# Patient Record
Sex: FEMALE | Race: BLACK OR AFRICAN AMERICAN | NOT HISPANIC OR LATINO | Employment: FULL TIME | URBAN - METROPOLITAN AREA
[De-identification: names, ages, dates, MRNs, and addresses within clinical notes are randomized per-mention and may not be internally consistent; named-entity substitution may affect disease eponyms.]

---

## 2017-12-13 ENCOUNTER — OFFICE VISIT (OUTPATIENT)
Dept: OBSTETRICS AND GYNECOLOGY | Facility: CLINIC | Age: 27
End: 2017-12-13
Payer: COMMERCIAL

## 2017-12-13 ENCOUNTER — LAB VISIT (OUTPATIENT)
Dept: LAB | Facility: HOSPITAL | Age: 27
End: 2017-12-13
Attending: OBSTETRICS & GYNECOLOGY
Payer: COMMERCIAL

## 2017-12-13 VITALS — DIASTOLIC BLOOD PRESSURE: 74 MMHG | SYSTOLIC BLOOD PRESSURE: 112 MMHG | WEIGHT: 156.06 LBS

## 2017-12-13 DIAGNOSIS — Z01.419 ENCOUNTER FOR ANNUAL ROUTINE GYNECOLOGICAL EXAMINATION: Primary | ICD-10-CM

## 2017-12-13 DIAGNOSIS — Z11.3 SCREENING EXAMINATION FOR STD (SEXUALLY TRANSMITTED DISEASE): ICD-10-CM

## 2017-12-13 DIAGNOSIS — Z01.419 ENCOUNTER FOR ANNUAL ROUTINE GYNECOLOGICAL EXAMINATION: ICD-10-CM

## 2017-12-13 DIAGNOSIS — Z12.4 PAP SMEAR FOR CERVICAL CANCER SCREENING: ICD-10-CM

## 2017-12-13 PROCEDURE — 86592 SYPHILIS TEST NON-TREP QUAL: CPT

## 2017-12-13 PROCEDURE — 88175 CYTOPATH C/V AUTO FLUID REDO: CPT

## 2017-12-13 PROCEDURE — 87480 CANDIDA DNA DIR PROBE: CPT

## 2017-12-13 PROCEDURE — 86703 HIV-1/HIV-2 1 RESULT ANTBDY: CPT

## 2017-12-13 PROCEDURE — 36415 COLL VENOUS BLD VENIPUNCTURE: CPT

## 2017-12-13 PROCEDURE — 87660 TRICHOMONAS VAGIN DIR PROBE: CPT

## 2017-12-13 PROCEDURE — 99999 PR PBB SHADOW E&M-NEW PATIENT-LVL III: CPT | Mod: PBBFAC,,, | Performed by: OBSTETRICS & GYNECOLOGY

## 2017-12-13 PROCEDURE — 80074 ACUTE HEPATITIS PANEL: CPT

## 2017-12-13 PROCEDURE — 87591 N.GONORRHOEAE DNA AMP PROB: CPT

## 2017-12-13 NOTE — PROGRESS NOTES
Chief Complaint   Patient presents with    Well Woman       HISTORY OF PRESENT ILLNESS:   Jahaira Evangelista is a 27 y.o. female female patient who presents for well woman exam.  Patient's last menstrual period was 2017 (exact date)..  She has no complaints.  Cycles are regular. Desires STD testing but unsure if she wants to do blood work testing.  declines   birth control as she isn't sexually active now.      History reviewed. No pertinent past medical history.     History reviewed. No pertinent surgical history.      Social History     Social History    Marital status: Single     Spouse name: N/A    Number of children: N/A    Years of education: N/A     Occupational History    Not on file.     Social History Main Topics    Smoking status: Never Smoker    Smokeless tobacco: Never Used    Alcohol use No    Drug use: No    Sexual activity: Not Currently     Other Topics Concern    Not on file     Social History Narrative    No narrative on file       Family History   Problem Relation Age of Onset    Breast cancer Paternal Aunt          OB History    Para Term  AB Living   0 0 0 0 0 0   SAB TAB Ectopic Multiple Live Births   0 0 0 0 0               COMPREHENSIVE GYN HISTORY:  PAP History: Denies abnormal Paps  Infection History: Denies STDs. Denies PID.  Benign History:Denies uterine fibroids. Denies ovarian cysts. Denies endometriosis Denies other conditions.  Cancer History: Denies cervical cancer. Denies uterine cancer or hyperplasia. Denies ovarian cancer. Denies vulvar cancer or pre-cancer. Denies vaginal cancer or pre-cancer. Denies breast cancer. Denies colon cancer.    ROS:  GENERAL: Denies weight gain or weight loss. Feeling well overall.   SKIN: Denies rash or lesions.   HEAD: Denies headache.   NODES: Denies enlarged lymph nodes.   CHEST: Denies shortness of breath.   ABDOMEN: No abdominal pain, constipation, diarrhea, nausea, vomiting or rectal bleeding.   URINARY: No  "frequency, dysuria, hematuria, or burning on urination.  REPRODUCTIVE: See HPI.   BREASTS: The patient denies pain, lumps, or nipple discharge.   HEMATOLOGIC: No easy bruisability.   MUSCULOSKELETAL: Denies joint pain or back pain.   NEUROLOGIC: Denies weakness.   PSYCHIATRIC: Denies depression, anxiety or mood swings.    /74   Wt 70.8 kg (156 lb 1.4 oz)   LMP 11/23/2017 (Exact Date)     APPEARANCE: Well nourished, well developed, in no acute distress.  NECK: Neck symmetric without  thyromegaly.  NODES: No inguinal, cervical lymph node enlargement.  CHEST: Lungs clear to auscultation.  HEART: Regular rate and rhythm, no murmurs, rubs or gallops.  ABDOMEN: Soft. No tenderness or masses. No hernias.  BREASTS: Symmetrical, no skin changes or visible lesions. No palpable masses, nipple discharge or adenopathy bilaterally.  PELVIC:   VULVA: No lesions. Normal female genitalia.  URETHRAL MEATUS: Normal size and location, no lesions, no prolapse.  URETHRA: No masses, tenderness, prolapse or scarring.  VAGINA: Moist and well rugated, no discharge, no significant cystocele or rectocele.  CERVIX: No lesions and discharge.  UTERUS: Normal anteverted size, regular shape, mobile, non-tender, bladder base nontender.  ADNEXA: No masses or tenderness.    Data Reviewed:   Last Pap: Date: ** Result: **  Last STD: Date: ** Result:**    1. Encounter for annual routine gynecological examination    2. Pap smear for cervical cancer screening    3. Screening examination for STD (sexually transmitted disease)        Plan:  Routine gyn s/p normal breast exam. Pap without HPV cotesting ordered . STD testing: GC/CT/trich, syphilis, HBV/HCV and HIV "ordered.       F/u in 1 yr or PN      "

## 2017-12-14 LAB
CANDIDA RRNA VAG QL PROBE: NEGATIVE
G VAGINALIS RRNA GENITAL QL PROBE: NEGATIVE
HAV IGM SERPL QL IA: NEGATIVE
HBV CORE IGM SERPL QL IA: NEGATIVE
HBV SURFACE AG SERPL QL IA: NEGATIVE
HBV SURFACE AG SERPL QL IA: NEGATIVE
HCV AB SERPL QL IA: NEGATIVE
HIV 1+2 AB+HIV1 P24 AG SERPL QL IA: NEGATIVE
RPR SER QL: NORMAL
T VAGINALIS RRNA GENITAL QL PROBE: NEGATIVE

## 2017-12-15 ENCOUNTER — TELEPHONE (OUTPATIENT)
Dept: OBSTETRICS AND GYNECOLOGY | Facility: CLINIC | Age: 27
End: 2017-12-15

## 2017-12-15 LAB
C TRACH DNA SPEC QL NAA+PROBE: NOT DETECTED
N GONORRHOEA DNA SPEC QL NAA+PROBE: NOT DETECTED

## 2017-12-15 NOTE — TELEPHONE ENCOUNTER
Please call patient and let her know that her STD testing was negative. We are still waiting on the results of her pap smear.

## 2017-12-15 NOTE — TELEPHONE ENCOUNTER
Spoke with pt and informed her of negative STD screening results. Informed pt we are still awaiting her pap results and will call her once they are in. Patient verbalized understanding.

## 2017-12-27 ENCOUNTER — LAB VISIT (OUTPATIENT)
Dept: LAB | Facility: HOSPITAL | Age: 27
End: 2017-12-27
Attending: FAMILY MEDICINE
Payer: COMMERCIAL

## 2017-12-27 ENCOUNTER — OFFICE VISIT (OUTPATIENT)
Dept: INTERNAL MEDICINE | Facility: CLINIC | Age: 27
End: 2017-12-27
Payer: COMMERCIAL

## 2017-12-27 VITALS
TEMPERATURE: 99 F | DIASTOLIC BLOOD PRESSURE: 63 MMHG | WEIGHT: 160.5 LBS | HEART RATE: 79 BPM | SYSTOLIC BLOOD PRESSURE: 104 MMHG | HEIGHT: 63 IN | BODY MASS INDEX: 28.44 KG/M2

## 2017-12-27 DIAGNOSIS — K64.9 HEMORRHOIDS, UNSPECIFIED HEMORRHOID TYPE: ICD-10-CM

## 2017-12-27 DIAGNOSIS — Z00.00 ROUTINE MEDICAL EXAM: ICD-10-CM

## 2017-12-27 DIAGNOSIS — Z00.00 ROUTINE MEDICAL EXAM: Primary | ICD-10-CM

## 2017-12-27 LAB
ALBUMIN SERPL BCP-MCNC: 3.4 G/DL
ALP SERPL-CCNC: 39 U/L
ALT SERPL W/O P-5'-P-CCNC: 22 U/L
ANION GAP SERPL CALC-SCNC: 4 MMOL/L
AST SERPL-CCNC: 27 U/L
BASOPHILS # BLD AUTO: 0.06 K/UL
BASOPHILS NFR BLD: 1.5 %
BILIRUB SERPL-MCNC: 0.3 MG/DL
BUN SERPL-MCNC: 9 MG/DL
CALCIUM SERPL-MCNC: 9.8 MG/DL
CHLORIDE SERPL-SCNC: 107 MMOL/L
CHOLEST SERPL-MCNC: 174 MG/DL
CHOLEST/HDLC SERPL: 2.6 {RATIO}
CO2 SERPL-SCNC: 27 MMOL/L
CREAT SERPL-MCNC: 0.7 MG/DL
DIFFERENTIAL METHOD: ABNORMAL
EOSINOPHIL # BLD AUTO: 0.4 K/UL
EOSINOPHIL NFR BLD: 9.2 %
ERYTHROCYTE [DISTWIDTH] IN BLOOD BY AUTOMATED COUNT: 14.6 %
EST. GFR  (AFRICAN AMERICAN): >60 ML/MIN/1.73 M^2
EST. GFR  (NON AFRICAN AMERICAN): >60 ML/MIN/1.73 M^2
GLUCOSE SERPL-MCNC: 90 MG/DL
HCT VFR BLD AUTO: 32 %
HDLC SERPL-MCNC: 67 MG/DL
HDLC SERPL: 38.5 %
HGB BLD-MCNC: 10.2 G/DL
IMM GRANULOCYTES # BLD AUTO: 0 K/UL
IMM GRANULOCYTES NFR BLD AUTO: 0 %
LDLC SERPL CALC-MCNC: 96.6 MG/DL
LYMPHOCYTES # BLD AUTO: 1.7 K/UL
LYMPHOCYTES NFR BLD: 41.6 %
MCH RBC QN AUTO: 27.8 PG
MCHC RBC AUTO-ENTMCNC: 31.9 G/DL
MCV RBC AUTO: 87 FL
MONOCYTES # BLD AUTO: 0.4 K/UL
MONOCYTES NFR BLD: 8.5 %
NEUTROPHILS # BLD AUTO: 1.6 K/UL
NEUTROPHILS NFR BLD: 39.2 %
NONHDLC SERPL-MCNC: 107 MG/DL
NRBC BLD-RTO: 0 /100 WBC
PLATELET # BLD AUTO: 258 K/UL
PMV BLD AUTO: 10.9 FL
POTASSIUM SERPL-SCNC: 4.7 MMOL/L
PROT SERPL-MCNC: 6.8 G/DL
RBC # BLD AUTO: 3.67 M/UL
SODIUM SERPL-SCNC: 138 MMOL/L
T4 FREE SERPL-MCNC: 0.84 NG/DL
TRIGL SERPL-MCNC: 52 MG/DL
TSH SERPL DL<=0.005 MIU/L-ACNC: 1 UIU/ML
WBC # BLD AUTO: 4.13 K/UL

## 2017-12-27 PROCEDURE — 84439 ASSAY OF FREE THYROXINE: CPT

## 2017-12-27 PROCEDURE — 84443 ASSAY THYROID STIM HORMONE: CPT

## 2017-12-27 PROCEDURE — 99395 PREV VISIT EST AGE 18-39: CPT | Mod: S$GLB,,, | Performed by: FAMILY MEDICINE

## 2017-12-27 PROCEDURE — 36415 COLL VENOUS BLD VENIPUNCTURE: CPT | Mod: PO

## 2017-12-27 PROCEDURE — 80053 COMPREHEN METABOLIC PANEL: CPT

## 2017-12-27 PROCEDURE — 85025 COMPLETE CBC W/AUTO DIFF WBC: CPT

## 2017-12-27 PROCEDURE — 99999 PR PBB SHADOW E&M-EST. PATIENT-LVL III: CPT | Mod: PBBFAC,,, | Performed by: FAMILY MEDICINE

## 2017-12-27 PROCEDURE — 80061 LIPID PANEL: CPT

## 2017-12-28 NOTE — PROGRESS NOTES
Subjective:   Patient ID: Jahaira Evangelista is a 27 y.o. female.    Chief Complaint: Annual Exam and Establish Care      HPI  28 yo female here for annual exam and to est with pcp. Asymptomatic. New to area. Works as [employer] .    She occ has a moderately painful hemorrhoid without bleeding.   Patient queried and denies any further complaints    PREVENTIVE MED  Diet  Exercise  Alcohol use  Tobacco  BP  Depression  Type 2 DM--will screen  Vision  ALL REVIEWED        PAST MEDICAL HISTORY:  History reviewed. No pertinent past medical history.    PAST SURGICAL HISTORY:  History reviewed. No pertinent surgical history.    SOCIAL HISTORY:  Social History     Social History    Marital status: Single     Spouse name: N/A    Number of children: N/A    Years of education: N/A     Occupational History    Not on file.     Social History Main Topics    Smoking status: Never Smoker    Smokeless tobacco: Never Used    Alcohol use No    Drug use: No    Sexual activity: Not Currently     Other Topics Concern    Not on file     Social History Narrative    No narrative on file       FAMILY HISTORY:  Family History   Problem Relation Age of Onset    Breast cancer Paternal Aunt        ALLERGIES AND MEDICATIONS: updated and reviewed.  Review of patient's allergies indicates:  No Known Allergies    Current Outpatient Prescriptions:     hydrocortisone-pramoxine (PROCTOFOAM-HS) rectal foam, Place 1 applicator rectally 2 (two) times daily., Disp: 10 g, Rfl: 5    Review of Systems   Constitutional: Negative for activity change, appetite change, chills, diaphoresis, fatigue, fever and unexpected weight change.   HENT: Negative for congestion, ear discharge, ear pain, facial swelling, hearing loss, nosebleeds, postnasal drip, rhinorrhea, sinus pressure, sneezing, sore throat, tinnitus, trouble swallowing and voice change.    Eyes: Negative for photophobia, pain, discharge, redness, itching and visual disturbance.   Respiratory:  "Negative for cough, chest tightness, shortness of breath and wheezing.    Cardiovascular: Negative for chest pain, palpitations and leg swelling.   Gastrointestinal: Negative for abdominal distention, abdominal pain, anal bleeding, blood in stool, constipation, diarrhea, nausea, rectal pain and vomiting.   Endocrine: Negative for cold intolerance, heat intolerance, polydipsia, polyphagia and polyuria.   Genitourinary: Negative for difficulty urinating, dysuria and flank pain.   Musculoskeletal: Negative for arthralgias, back pain, joint swelling, myalgias and neck pain.   Skin: Negative for rash.   Neurological: Negative for dizziness, tremors, seizures, syncope, speech difficulty, weakness, light-headedness, numbness and headaches.   Psychiatric/Behavioral: Negative for behavioral problems, confusion, decreased concentration, dysphoric mood, sleep disturbance and suicidal ideas. The patient is not nervous/anxious and is not hyperactive.        Objective:     Vitals:    12/27/17 0812   BP: 104/63   Pulse: 79   Temp: 99.2 °F (37.3 °C)   TempSrc: Oral   Weight: 72.8 kg (160 lb 7.9 oz)   Height: 5' 3" (1.6 m)   PainSc: 0-No pain     Body mass index is 28.43 kg/m².    Physical Exam   Constitutional: She is oriented to person, place, and time. She appears well-developed and well-nourished. She is cooperative. She does not have a sickly appearance. No distress.   HENT:   Head: Normocephalic and atraumatic.   Right Ear: Hearing, tympanic membrane, external ear and ear canal normal. No tenderness.   Left Ear: Hearing, tympanic membrane, external ear and ear canal normal. No tenderness.   Nose: Nose normal.   Mouth/Throat: Oropharynx is clear and moist. Normal dentition. No oropharyngeal exudate, posterior oropharyngeal edema or posterior oropharyngeal erythema.   Eyes: Conjunctivae and lids are normal. Right eye exhibits no discharge. Left eye exhibits no discharge. Right conjunctiva is not injected. Left conjunctiva is not " injected. No scleral icterus. Right eye exhibits normal extraocular motion. Left eye exhibits normal extraocular motion.   Neck: Normal range of motion. Neck supple. No JVD present. Carotid bruit is not present. No tracheal deviation and no edema present. No thyromegaly present.   Cardiovascular: Normal rate, regular rhythm, normal heart sounds and normal pulses.  Exam reveals no friction rub.    No murmur heard.  Pulmonary/Chest: Effort normal and breath sounds normal. No accessory muscle usage. No respiratory distress. She has no wheezes. She has no rhonchi. She has no rales.   Musculoskeletal: She exhibits no edema.   Lymphadenopathy:        Head (right side): No submandibular adenopathy present.        Head (left side): No submandibular adenopathy present.     She has no cervical adenopathy.   Neurological: She is alert and oriented to person, place, and time.   Skin: Skin is warm and dry. She is not diaphoretic.   Psychiatric: Her speech is normal and behavior is normal. Thought content normal. Her mood appears not anxious. Her affect is not angry, not labile and not inappropriate. She does not exhibit a depressed mood.       Assessment and Plan:   Jahaira was seen today for annual exam and establish care.    Diagnoses and all orders for this visit:    Routine medical exam  -     CBC auto differential; Future  -     Comprehensive metabolic panel; Future  -     Lipid panel; Future  -     TSH; Future  -     T4, free; Future  -     Urinalysis; Future    Hemorrhoids, unspecified hemorrhoid type    -     hydrocortisone-pramoxine (PROCTOFOAM-HS) rectal foam; Place 1 applicator rectally 2 (two) times daily.  Discussed staying reg with water, fiber. Declines referral.      Return in about 1 year (around 12/27/2018).        THIS NOTE WILL BE SHARED WITH THE PATIENT.

## 2018-04-12 ENCOUNTER — OFFICE VISIT (OUTPATIENT)
Dept: ORTHOPEDICS | Facility: CLINIC | Age: 28
End: 2018-04-12
Payer: COMMERCIAL

## 2018-04-12 VITALS
HEART RATE: 72 BPM | SYSTOLIC BLOOD PRESSURE: 96 MMHG | BODY MASS INDEX: 29.5 KG/M2 | WEIGHT: 166.5 LBS | DIASTOLIC BLOOD PRESSURE: 54 MMHG | HEIGHT: 63 IN

## 2018-04-12 DIAGNOSIS — S63.92XA HAND SPRAIN, LEFT, INITIAL ENCOUNTER: Primary | ICD-10-CM

## 2018-04-12 PROCEDURE — 99204 OFFICE O/P NEW MOD 45 MIN: CPT | Mod: S$GLB,,, | Performed by: ORTHOPAEDIC SURGERY

## 2018-04-12 PROCEDURE — 99999 PR PBB SHADOW E&M-EST. PATIENT-LVL III: CPT | Mod: PBBFAC,,, | Performed by: ORTHOPAEDIC SURGERY

## 2018-04-12 NOTE — PROGRESS NOTES
Subjective:      Patient ID: Jahaira Evangelista is a 27 y.o. female.    Chief Complaint: Pain and Injury of the Left Wrist    Patient presents with left wrist s/p fall.  Had immediate disability of function after the injury.  Presented to outside facility where the patient was given ortho follow-up.  This is a closed injury.  No paresthesias/numbness.            Review of Systems   Constitution: Negative for chills and fever.   Cardiovascular: Negative for chest pain and syncope.   Respiratory: Negative for cough and shortness of breath.    Gastrointestinal: Negative for nausea and vomiting.   Neurological: Negative for brief paralysis and seizures.   Psychiatric/Behavioral: Negative for altered mental status and hallucinations.         Objective:            General    Constitutional: She is oriented to person, place, and time. She appears well-developed and well-nourished.   HENT:   Head: Normocephalic and atraumatic.   Eyes: Conjunctivae are normal.   Neck: Normal range of motion.   Cardiovascular: Intact distal pulses.    Pulmonary/Chest: Effort normal.   Neurological: She is alert and oriented to person, place, and time.   Psychiatric: She has a normal mood and affect. Her behavior is normal. Judgment and thought content normal.         Left Hand/Wrist Exam     Inspection   Scars: Hand -  absent  Effusion: Hand -  absent  Bruising: Hand -  absent  Deformity: Hand -  absent    Range of Motion     Wrist   Extension: 50   Flexion: 90     Tests   Flexor Digitorum Superficialis Test: normal  Flexor Digitorum Profundus Test: normal      Other     Sensory Exam  Median Distribution: normal  Ulnar Distribution: normal  Radial Distribution: normal          Muscle Strength   Left Upper Extremity  Wrist Extension: 5/5/5   Wrist Flexion: 5/5/5   :  5/5/5   Index Finger: 5/5  Middle Finger: 5/5  Ring Finger: 5/5  Little Finger: 5/5  Thumb - APB: 5/5  Thumb - FPL: 5/5    Vascular Exam       Capillary Refill  Left Hand: normal  capillary refill    Radiographs of the LEFT wrist/hand demonstrate no fracture/dislocation          Assessment:       Encounter Diagnosis   Name Primary?    Hand sprain, left, initial encounter Yes          Plan:       Jahaira was seen today for pain and injury.    Diagnoses and all orders for this visit:    Hand sprain, left, initial encounter      26yo F with LEFT hand sprain    Activity as tolerated  RTC 1 month

## 2018-05-31 ENCOUNTER — TELEPHONE (OUTPATIENT)
Dept: ORTHOPEDICS | Facility: CLINIC | Age: 28
End: 2018-05-31

## 2018-07-19 ENCOUNTER — OFFICE VISIT (OUTPATIENT)
Dept: ORTHOPEDICS | Facility: CLINIC | Age: 28
End: 2018-07-19
Payer: COMMERCIAL

## 2018-07-19 VITALS
SYSTOLIC BLOOD PRESSURE: 104 MMHG | WEIGHT: 167 LBS | BODY MASS INDEX: 29.59 KG/M2 | HEART RATE: 66 BPM | DIASTOLIC BLOOD PRESSURE: 68 MMHG | HEIGHT: 63 IN

## 2018-07-19 DIAGNOSIS — S63.502D SPRAIN OF LEFT WRIST, SUBSEQUENT ENCOUNTER: Primary | ICD-10-CM

## 2018-07-19 PROCEDURE — 3008F BODY MASS INDEX DOCD: CPT | Mod: CPTII,S$GLB,, | Performed by: ORTHOPAEDIC SURGERY

## 2018-07-19 PROCEDURE — 99214 OFFICE O/P EST MOD 30 MIN: CPT | Mod: S$GLB,,, | Performed by: ORTHOPAEDIC SURGERY

## 2018-07-19 PROCEDURE — 99999 PR PBB SHADOW E&M-EST. PATIENT-LVL III: CPT | Mod: PBBFAC,,, | Performed by: ORTHOPAEDIC SURGERY

## 2018-07-19 NOTE — PROGRESS NOTES
Subjective:      Patient ID: Jahaira Evangelista is a 27 y.o. female.    Chief Complaint: Pain of the Left Wrist    Patient presents with left wrist s/p fall.  Had immediate disability of function after the injury.  Presented to outside facility where the patient was given ortho follow-up.  This is a closed injury.  No paresthesias/numbness.      Returns today residual complaints of pain with certain activities at crossfit      Pain   Pertinent negatives include no chest pain, chills, coughing, fever, nausea or vomiting.       Review of Systems   Constitution: Negative for chills and fever.   Cardiovascular: Negative for chest pain and syncope.   Respiratory: Negative for cough and shortness of breath.    Gastrointestinal: Negative for nausea and vomiting.   Neurological: Negative for brief paralysis and seizures.   Psychiatric/Behavioral: Negative for altered mental status and hallucinations.         Objective:            General    Constitutional: She is oriented to person, place, and time. She appears well-developed and well-nourished.   HENT:   Head: Normocephalic and atraumatic.   Eyes: Conjunctivae are normal.   Neck: Normal range of motion.   Cardiovascular: Intact distal pulses.    Pulmonary/Chest: Effort normal.   Neurological: She is alert and oriented to person, place, and time.   Psychiatric: She has a normal mood and affect. Her behavior is normal. Judgment and thought content normal.         Left Hand/Wrist Exam     Inspection   Scars: Hand -  absent  Effusion: Hand -  absent  Bruising: Hand -  absent  Deformity: Hand -  absent    Range of Motion     Wrist   Extension: 50   Flexion: 90     Tests   Flexor Digitorum Superficialis Test: normal  Flexor Digitorum Profundus Test: normal      Other     Sensory Exam  Median Distribution: normal  Ulnar Distribution: normal  Radial Distribution: normal          Muscle Strength   Left Upper Extremity  Wrist Extension: 5/5/5   Wrist Flexion: 5/5/5   :  5/5/5    Index Finger: 5/5  Middle Finger: 5/5  Ring Finger: 5/5  Little Finger: 5/5  Thumb - APB: 5/5  Thumb - FPL: 5/5    Vascular Exam       Capillary Refill  Left Hand: normal capillary refill    Radiographs of the LEFT wrist/hand demonstrate no fracture/dislocation        Assessment:       Encounter Diagnosis   Name Primary?    Sprain of left wrist, subsequent encounter Yes          Plan:       Jahaira was seen today for pain.    Diagnoses and all orders for this visit:    Sprain of left wrist, subsequent encounter  -     MRI Wrist Joint Without Contrast Left; Future      26yo F with LEFT hand sprain    Activity as tolerated  MRI of LEFT wrist  RTC after imaging

## 2018-07-26 ENCOUNTER — TELEPHONE (OUTPATIENT)
Dept: ORTHOPEDICS | Facility: CLINIC | Age: 28
End: 2018-07-26

## 2018-07-26 NOTE — TELEPHONE ENCOUNTER
Spoke with patient. Stated that she would like to have her MRI done at [facility] for insurance reasons. MRI order faxed to the number provided. Patient is aware. No further issues discussed.

## 2018-07-26 NOTE — TELEPHONE ENCOUNTER
----- Message from Tata Cancino sent at 7/26/2018 10:17 AM CDT -----  Type: Needs Medical Advice    Who Called:  Patient  Best Call Back Number: [phone]  Additional Information: Patient requesting order for MRI (wrist)be sent to [facility] at fax number [phone] or if any questions call back.

## 2018-08-16 ENCOUNTER — OFFICE VISIT (OUTPATIENT)
Dept: ORTHOPEDICS | Facility: CLINIC | Age: 28
End: 2018-08-16
Payer: COMMERCIAL

## 2018-08-16 VITALS
BODY MASS INDEX: 30.72 KG/M2 | HEART RATE: 75 BPM | SYSTOLIC BLOOD PRESSURE: 110 MMHG | HEIGHT: 63 IN | WEIGHT: 173.38 LBS | TEMPERATURE: 98 F | DIASTOLIC BLOOD PRESSURE: 66 MMHG

## 2018-08-16 DIAGNOSIS — S63.502D SPRAIN OF LEFT WRIST, SUBSEQUENT ENCOUNTER: Primary | ICD-10-CM

## 2018-08-16 PROCEDURE — 99214 OFFICE O/P EST MOD 30 MIN: CPT | Mod: S$GLB,,, | Performed by: ORTHOPAEDIC SURGERY

## 2018-08-16 PROCEDURE — 99999 PR PBB SHADOW E&M-EST. PATIENT-LVL III: CPT | Mod: PBBFAC,,, | Performed by: ORTHOPAEDIC SURGERY

## 2018-08-16 PROCEDURE — 3008F BODY MASS INDEX DOCD: CPT | Mod: CPTII,S$GLB,, | Performed by: ORTHOPAEDIC SURGERY

## 2018-09-13 NOTE — PROGRESS NOTES
Subjective:      Patient ID: Jahaira Evangelista is a 28 y.o. female.    Chief Complaint: Pain of the Left Wrist    Patient presents with left wrist s/p fall.  Had immediate disability of function after the injury.  Presented to outside facility where the patient was given ortho follow-up.  This is a closed injury.  No paresthesias/numbness.      Returns today for review of outside MRI      Pain   Pertinent negatives include no chest pain, chills, coughing, fever, nausea or vomiting.       Review of Systems   Constitution: Negative for chills and fever.   Cardiovascular: Negative for chest pain and syncope.   Respiratory: Negative for cough and shortness of breath.    Gastrointestinal: Negative for nausea and vomiting.   Neurological: Negative for brief paralysis and seizures.   Psychiatric/Behavioral: Negative for altered mental status and hallucinations.         Objective:            General    Constitutional: She is oriented to person, place, and time. She appears well-developed and well-nourished.   HENT:   Head: Normocephalic and atraumatic.   Eyes: Conjunctivae are normal.   Neck: Normal range of motion.   Cardiovascular: Intact distal pulses.    Pulmonary/Chest: Effort normal.   Neurological: She is alert and oriented to person, place, and time.   Psychiatric: She has a normal mood and affect. Her behavior is normal. Judgment and thought content normal.         Left Hand/Wrist Exam     Inspection   Scars: Hand -  absent  Effusion: Hand -  absent  Bruising: Hand -  absent  Deformity: Hand -  absent    Range of Motion     Wrist   Extension: 50   Flexion: 90     Tests   Flexor Digitorum Superficialis Test: normal  Flexor Digitorum Profundus Test: normal      Other     Sensory Exam  Median Distribution: normal  Ulnar Distribution: normal  Radial Distribution: normal          Muscle Strength   Left Upper Extremity  Wrist extension: 5/5/5   Wrist flexion: 5/5/5   :  5/5/5   Index Finger: 5/5  Middle Finger:  5/5  Ring Finger: 5/5  Little Finger: 5/5  Thumb - APB: 5/5  Thumb - FPL: 5/5    Vascular Exam       Capillary Refill  Left Hand: normal capillary refill    Radiographs of the LEFT wrist/hand demonstrate no fracture/dislocation    Outside MRI normal        Assessment:       No diagnosis found.       Plan:       There are no diagnoses linked to this encounter.  28yo F with LEFT hand sprain    Activity as tolerated  MRI of LEFT wrist  RTC after imaging

## 2018-12-06 NOTE — TELEPHONE ENCOUNTER
Left call back message. Calling patient to help establish new f/u appointment with Dr Claire.  
Spoke with patient, appointment established. Patient indicated understanding. No further issues discussed.  
Wear support bra and abdominal compression

## 2018-12-14 ENCOUNTER — TELEPHONE (OUTPATIENT)
Dept: OBSTETRICS AND GYNECOLOGY | Facility: CLINIC | Age: 28
End: 2018-12-14

## 2018-12-14 ENCOUNTER — OFFICE VISIT (OUTPATIENT)
Dept: OBSTETRICS AND GYNECOLOGY | Facility: CLINIC | Age: 28
End: 2018-12-14
Payer: COMMERCIAL

## 2018-12-14 VITALS
SYSTOLIC BLOOD PRESSURE: 106 MMHG | BODY MASS INDEX: 29.25 KG/M2 | DIASTOLIC BLOOD PRESSURE: 72 MMHG | WEIGHT: 165.13 LBS

## 2018-12-14 DIAGNOSIS — N85.2 ENLARGED UTERUS: ICD-10-CM

## 2018-12-14 DIAGNOSIS — D21.9 FIBROIDS: ICD-10-CM

## 2018-12-14 DIAGNOSIS — Z01.419 ENCOUNTER FOR ANNUAL ROUTINE GYNECOLOGICAL EXAMINATION: Primary | ICD-10-CM

## 2018-12-14 PROCEDURE — 99395 PREV VISIT EST AGE 18-39: CPT | Mod: S$GLB,,, | Performed by: OBSTETRICS & GYNECOLOGY

## 2018-12-14 PROCEDURE — 99999 PR PBB SHADOW E&M-EST. PATIENT-LVL II: CPT | Mod: PBBFAC,,, | Performed by: OBSTETRICS & GYNECOLOGY

## 2018-12-14 NOTE — PROGRESS NOTES
Chief Complaint   Patient presents with    Well Woman       HISTORY OF PRESENT ILLNESS:   Jahaira Evangelista is a 28 y.o. female female patient who presents for well woman exam.  Patient's last menstrual period was 2018 (exact date)..  She reports that in Feb she had a time where she couldn't use the restroom for a day then resolved. 2 weeks ago had the same issue and started having severe pain so had to go to ER and have 900cc drained. Had normal cath UA and negative UPT. Is not sexually active.  Cycles are regular. Declines STD.  declines   birth control as she isn't sexually active now. Denies vision or neurologic issues.      History reviewed. No pertinent past medical history.       Past Surgical History:   Procedure Laterality Date    KNEE ARTHROSCOPY           Social History     Socioeconomic History    Marital status: Single     Spouse name: Not on file    Number of children: Not on file    Years of education: Not on file    Highest education level: Not on file   Social Needs    Financial resource strain: Not on file    Food insecurity - worry: Not on file    Food insecurity - inability: Not on file    Transportation needs - medical: Not on file    Transportation needs - non-medical: Not on file   Occupational History    Not on file   Tobacco Use    Smoking status: Never Smoker    Smokeless tobacco: Never Used   Substance and Sexual Activity    Alcohol use: No    Drug use: No    Sexual activity: Not Currently     Partners: Male   Other Topics Concern    Not on file   Social History Narrative    Not on file       Family History   Problem Relation Age of Onset    Breast cancer Paternal Aunt     Miscarriages / Stillbirths Mother     Hypertension Father     Miscarriages / Stillbirths Sister      labor Sister     Diabetes Maternal Grandfather     Colon cancer Neg Hx     Eclampsia Neg Hx     Ovarian cancer Neg Hx     Stroke Neg Hx          OB History    Para Term   AB Living   0 0 0 0 0 0   SAB TAB Ectopic Multiple Live Births   0 0 0 0 0               COMPREHENSIVE GYN HISTORY:  PAP History: Denies abnormal Paps, 2017 NILM   Infection History: Denies STDs. Denies PID.  Benign History: has uterine fibroids. Denies ovarian cysts. Denies endometriosis Denies other conditions.  Cancer History: Denies cervical cancer. Denies uterine cancer or hyperplasia. Denies ovarian cancer. Denies vulvar cancer or pre-cancer. Denies vaginal cancer or pre-cancer. Denies breast cancer. Denies colon cancer.    ROS:  GENERAL: Denies weight gain or weight loss. Feeling well overall.   SKIN: Denies rash or lesions.   HEAD: Denies headache.   NODES: Denies enlarged lymph nodes.   CHEST: Denies shortness of breath.   ABDOMEN: No abdominal pain, constipation, diarrhea, nausea, vomiting or rectal bleeding.   URINARY: No frequency, dysuria, hematuria, or burning on urination.  REPRODUCTIVE: See HPI.   BREASTS: The patient denies pain, lumps, or nipple discharge.   HEMATOLOGIC: No easy bruisability.   MUSCULOSKELETAL: Denies joint pain or back pain.   NEUROLOGIC: Denies weakness.   PSYCHIATRIC: Denies depression, anxiety or mood swings.    /72   Wt 74.9 kg (165 lb 2 oz)   LMP 2018 (Exact Date)   BMI 29.25 kg/m²     APPEARANCE: Well nourished, well developed, in no acute distress.  NECK: Neck symmetric without  thyromegaly.  NODES: No inguinal, cervical lymph node enlargement.  CHEST: Lungs clear to auscultation.  HEART: Regular rate and rhythm, no murmurs, rubs or gallops.  ABDOMEN: Soft. No tenderness or masses. No hernias.  BREASTS: Symmetrical, no skin changes or visible lesions. No palpable masses, nipple discharge or adenopathy bilaterally.  PELVIC:   VULVA: No lesions. Normal female genitalia.  URETHRAL MEATUS: Normal size and location, no lesions, no prolapse.  URETHRA: No masses, tenderness, prolapse or scarring.  VAGINA: Moist and well rugated, no discharge, no  significant cystocele or rectocele.  CERVIX: No lesions and discharge.  UTERUS: enlarged about 12-14 size, more globular on right side, mobile, non-tender, bladder base nontender.  ADNEXA: No masses or tenderness.        1. Encounter for annual routine gynecological examination        Plan:  Routine gyn s/p normal breast exam. Pap up to date with repeat needed in 3 years. STD testing: GC/CT/trich, syphilis, HBV/HCV and HIV declined. Will get US to evaluate uterus as the enlarged uterus could be causing obstruction but must also think about other causes like urethral obstruction or neurologic component. Will f/u after US.

## 2018-12-14 NOTE — TELEPHONE ENCOUNTER
----- Message from Elmira Fern sent at 12/14/2018 12:49 PM CST -----  Contact: self, [phone] (M)  Patient states she is on her way to her 1 pm appointment today. States she will be 15 minutes late. Please advise.

## 2018-12-24 ENCOUNTER — OFFICE VISIT (OUTPATIENT)
Dept: OBSTETRICS AND GYNECOLOGY | Facility: CLINIC | Age: 28
End: 2018-12-24
Payer: COMMERCIAL

## 2018-12-24 ENCOUNTER — PROCEDURE VISIT (OUTPATIENT)
Dept: OBSTETRICS AND GYNECOLOGY | Facility: CLINIC | Age: 28
End: 2018-12-24
Payer: COMMERCIAL

## 2018-12-24 VITALS
SYSTOLIC BLOOD PRESSURE: 102 MMHG | BODY MASS INDEX: 29.02 KG/M2 | DIASTOLIC BLOOD PRESSURE: 60 MMHG | WEIGHT: 163.81 LBS

## 2018-12-24 DIAGNOSIS — N85.2 ENLARGED UTERUS: ICD-10-CM

## 2018-12-24 DIAGNOSIS — D25.9 UTERINE LEIOMYOMA, UNSPECIFIED LOCATION: Primary | ICD-10-CM

## 2018-12-24 DIAGNOSIS — D21.9 FIBROIDS: ICD-10-CM

## 2018-12-24 PROCEDURE — 99999 PR PBB SHADOW E&M-EST. PATIENT-LVL II: CPT | Mod: PBBFAC,,, | Performed by: OBSTETRICS & GYNECOLOGY

## 2018-12-24 PROCEDURE — 99213 OFFICE O/P EST LOW 20 MIN: CPT | Mod: 25,S$GLB,, | Performed by: OBSTETRICS & GYNECOLOGY

## 2018-12-24 PROCEDURE — 76856 US EXAM PELVIC COMPLETE: CPT | Mod: S$GLB,,, | Performed by: OBSTETRICS & GYNECOLOGY

## 2018-12-24 PROCEDURE — 3008F BODY MASS INDEX DOCD: CPT | Mod: CPTII,S$GLB,, | Performed by: OBSTETRICS & GYNECOLOGY

## 2018-12-24 NOTE — PROGRESS NOTES
Chief Complaint   Patient presents with    Pelvic Discomfort       HISTORY OF PRESENT ILLNESS:   Jahaira Evangelista is a 28 y.o. female female patient who presents for f/u US.  Patient's last menstrual period was 12/15/2018 (approximate)..  She reports that in Feb she had a time where she couldn't use the restroom for a day then resolved. 2 weeks ago had the same issue and started having severe pain so had to go to ER and have 900cc drained. Had normal cath UA and negative UPT. Is not sexually active.  Cycles are regular. Declines STD.  declines   birth control as she isn't sexually active now. Denies vision or neurologic issues.      History reviewed. No pertinent past medical history.       Past Surgical History:   Procedure Laterality Date    KNEE ARTHROSCOPY           Social History     Socioeconomic History    Marital status: Single     Spouse name: Not on file    Number of children: Not on file    Years of education: Not on file    Highest education level: Not on file   Social Needs    Financial resource strain: Not on file    Food insecurity - worry: Not on file    Food insecurity - inability: Not on file    Transportation needs - medical: Not on file    Transportation needs - non-medical: Not on file   Occupational History    Not on file   Tobacco Use    Smoking status: Never Smoker    Smokeless tobacco: Never Used   Substance and Sexual Activity    Alcohol use: No    Drug use: No    Sexual activity: Not Currently     Partners: Male   Other Topics Concern    Not on file   Social History Narrative    Not on file       Family History   Problem Relation Age of Onset    Breast cancer Paternal Aunt     Miscarriages / Stillbirths Mother     Hypertension Father     Miscarriages / Stillbirths Sister      labor Sister     Diabetes Maternal Grandfather     Colon cancer Neg Hx     Eclampsia Neg Hx     Ovarian cancer Neg Hx     Stroke Neg Hx          OB History    Para Term   AB Living   0 0 0 0 0 0   SAB TAB Ectopic Multiple Live Births   0 0 0 0 0               COMPREHENSIVE GYN HISTORY:  PAP History: Denies abnormal Paps, 2017 NILM   Infection History: Denies STDs. Denies PID.  Benign History: has uterine fibroids. Denies ovarian cysts. Denies endometriosis Denies other conditions.  Cancer History: Denies cervical cancer. Denies uterine cancer or hyperplasia. Denies ovarian cancer. Denies vulvar cancer or pre-cancer. Denies vaginal cancer or pre-cancer. Denies breast cancer. Denies colon cancer.    ROS:  GENERAL: Denies weight gain or weight loss. Feeling well overall.   SKIN: Denies rash or lesions.   HEAD: Denies headache.   NODES: Denies enlarged lymph nodes.   CHEST: Denies shortness of breath.   ABDOMEN: No abdominal pain, constipation, diarrhea, nausea, vomiting or rectal bleeding.   URINARY: No frequency, dysuria, hematuria, or burning on urination.  REPRODUCTIVE: See HPI.   BREASTS: The patient denies pain, lumps, or nipple discharge.   HEMATOLOGIC: No easy bruisability.   MUSCULOSKELETAL: Denies joint pain or back pain.   NEUROLOGIC: Denies weakness.   PSYCHIATRIC: Denies depression, anxiety or mood swings.    /60   Wt 74.3 kg (163 lb 12.8 oz)   LMP 12/15/2018 (Approximate)   BMI 29.02 kg/m²     APPEARANCE: Well nourished, well developed, in no acute distress.  NECK: Neck symmetric without  thyromegaly.    US: uterus [id number] with 7 cm anterior right fibroid and posterior 4 cm fibroid  b ovaries normal         1. Uterine leiomyoma, unspecified location        Plan:  Spent 10 minutes discussed options. Discussed with her that I think it was the fibroid uterus causing her urinary issues. We dicussed the options would be do nothing, which I wouldn't recommend, do an open myometcomy. We discussed due to the fibroid size that I would recommend lsc however we discussed some times we can do depo lupron to try to shrink them then consider lsc vs robotic. She  wants to think about it and let me know. We also discussed that I can't guarantee this is what is causing the urinary issue so I could refer to urology for work up as well. She declines at this point but will call back,

## 2018-12-28 ENCOUNTER — OFFICE VISIT (OUTPATIENT)
Dept: INTERNAL MEDICINE | Facility: CLINIC | Age: 28
End: 2018-12-28
Payer: COMMERCIAL

## 2018-12-28 ENCOUNTER — LAB VISIT (OUTPATIENT)
Dept: LAB | Facility: HOSPITAL | Age: 28
End: 2018-12-28
Attending: FAMILY MEDICINE
Payer: COMMERCIAL

## 2018-12-28 VITALS
WEIGHT: 166.88 LBS | TEMPERATURE: 98 F | DIASTOLIC BLOOD PRESSURE: 63 MMHG | SYSTOLIC BLOOD PRESSURE: 103 MMHG | HEIGHT: 63 IN | OXYGEN SATURATION: 99 % | HEART RATE: 77 BPM | BODY MASS INDEX: 29.57 KG/M2

## 2018-12-28 DIAGNOSIS — Z00.00 GENERAL MEDICAL EXAM: ICD-10-CM

## 2018-12-28 DIAGNOSIS — D25.9 UTERINE LEIOMYOMA, UNSPECIFIED LOCATION: ICD-10-CM

## 2018-12-28 DIAGNOSIS — Z00.00 GENERAL MEDICAL EXAM: Primary | ICD-10-CM

## 2018-12-28 DIAGNOSIS — D64.9 ANEMIA, UNSPECIFIED TYPE: ICD-10-CM

## 2018-12-28 LAB
ALBUMIN SERPL BCP-MCNC: 3.4 G/DL
ALP SERPL-CCNC: 51 U/L
ALT SERPL W/O P-5'-P-CCNC: 22 U/L
ANION GAP SERPL CALC-SCNC: 4 MMOL/L
AST SERPL-CCNC: 35 U/L
BASOPHILS # BLD AUTO: 0.07 K/UL
BASOPHILS NFR BLD: 1.4 %
BILIRUB SERPL-MCNC: 0.2 MG/DL
BUN SERPL-MCNC: 12 MG/DL
CALCIUM SERPL-MCNC: 10.4 MG/DL
CHLORIDE SERPL-SCNC: 108 MMOL/L
CHOLEST SERPL-MCNC: 150 MG/DL
CHOLEST/HDLC SERPL: 2.9 {RATIO}
CO2 SERPL-SCNC: 25 MMOL/L
CREAT SERPL-MCNC: 0.8 MG/DL
DIFFERENTIAL METHOD: ABNORMAL
EOSINOPHIL # BLD AUTO: 0.3 K/UL
EOSINOPHIL NFR BLD: 6 %
ERYTHROCYTE [DISTWIDTH] IN BLOOD BY AUTOMATED COUNT: 15.2 %
EST. GFR  (AFRICAN AMERICAN): >60 ML/MIN/1.73 M^2
EST. GFR  (NON AFRICAN AMERICAN): >60 ML/MIN/1.73 M^2
ESTIMATED AVG GLUCOSE: 114 MG/DL
GLUCOSE SERPL-MCNC: 84 MG/DL
HBA1C MFR BLD HPLC: 5.6 %
HCT VFR BLD AUTO: 33.9 %
HDLC SERPL-MCNC: 51 MG/DL
HDLC SERPL: 34 %
HGB BLD-MCNC: 10.3 G/DL
IMM GRANULOCYTES # BLD AUTO: 0.01 K/UL
IMM GRANULOCYTES NFR BLD AUTO: 0.2 %
LDLC SERPL CALC-MCNC: 90.6 MG/DL
LYMPHOCYTES # BLD AUTO: 2.3 K/UL
LYMPHOCYTES NFR BLD: 47.7 %
MCH RBC QN AUTO: 27 PG
MCHC RBC AUTO-ENTMCNC: 30.4 G/DL
MCV RBC AUTO: 89 FL
MONOCYTES # BLD AUTO: 0.4 K/UL
MONOCYTES NFR BLD: 7.6 %
NEUTROPHILS # BLD AUTO: 1.8 K/UL
NEUTROPHILS NFR BLD: 37.1 %
NONHDLC SERPL-MCNC: 99 MG/DL
NRBC BLD-RTO: 0 /100 WBC
PLATELET # BLD AUTO: 289 K/UL
PMV BLD AUTO: 11.1 FL
POTASSIUM SERPL-SCNC: 4.7 MMOL/L
PROT SERPL-MCNC: 6.9 G/DL
RBC # BLD AUTO: 3.82 M/UL
SODIUM SERPL-SCNC: 137 MMOL/L
T4 FREE SERPL-MCNC: 0.95 NG/DL
TRIGL SERPL-MCNC: 42 MG/DL
TSH SERPL DL<=0.005 MIU/L-ACNC: 1.18 UIU/ML
WBC # BLD AUTO: 4.84 K/UL

## 2018-12-28 PROCEDURE — 80061 LIPID PANEL: CPT

## 2018-12-28 PROCEDURE — 99999 PR PBB SHADOW E&M-EST. PATIENT-LVL III: CPT | Mod: PBBFAC,,, | Performed by: FAMILY MEDICINE

## 2018-12-28 PROCEDURE — 99395 PREV VISIT EST AGE 18-39: CPT | Mod: S$GLB,,, | Performed by: FAMILY MEDICINE

## 2018-12-28 PROCEDURE — 80053 COMPREHEN METABOLIC PANEL: CPT

## 2018-12-28 PROCEDURE — 84443 ASSAY THYROID STIM HORMONE: CPT

## 2018-12-28 PROCEDURE — 85025 COMPLETE CBC W/AUTO DIFF WBC: CPT

## 2018-12-28 PROCEDURE — 36415 COLL VENOUS BLD VENIPUNCTURE: CPT | Mod: PO

## 2018-12-28 PROCEDURE — 83036 HEMOGLOBIN GLYCOSYLATED A1C: CPT

## 2018-12-28 PROCEDURE — 84439 ASSAY OF FREE THYROXINE: CPT

## 2018-12-28 NOTE — PROGRESS NOTES
Subjective:   Patient ID: Jahaira Evangelista is a 28 y.o. female.    Chief Complaint: Annual Exam      HPI  27 yo female here for annual exam. Recently had acute urinary retention. Work-up shows it is likely large fibroids and she has seen     Patient queried and denies any further complaints.    PREVENTIVE MED  Diet  Exercise  Colorectal Ca  Alcohol use  Tobacco  BP  Depression  Type 2 DM  Hep C  STD  Vision  ALL REVIEWED      ALLERGIES AND MEDICATIONS: updated and reviewed.  Review of patient's allergies indicates:  No Known Allergies  No current outpatient medications on file.    Review of Systems   Constitutional: Negative for activity change, appetite change, chills, diaphoresis, fatigue, fever and unexpected weight change.   HENT: Negative for congestion, ear discharge, ear pain, facial swelling, hearing loss, nosebleeds, postnasal drip, rhinorrhea, sinus pressure, sneezing, sore throat, tinnitus, trouble swallowing and voice change.    Eyes: Negative for photophobia, pain, discharge, redness, itching and visual disturbance.   Respiratory: Negative for cough, chest tightness, shortness of breath and wheezing.    Cardiovascular: Negative for chest pain, palpitations and leg swelling.   Gastrointestinal: Negative for abdominal distention, abdominal pain, anal bleeding, blood in stool, constipation, diarrhea, nausea, rectal pain and vomiting.   Endocrine: Negative for cold intolerance, heat intolerance, polydipsia, polyphagia and polyuria.   Genitourinary: Negative for difficulty urinating, dysuria and flank pain.   Musculoskeletal: Negative for arthralgias, back pain, joint swelling, myalgias and neck pain.   Skin: Negative for rash.   Neurological: Negative for dizziness, tremors, seizures, syncope, speech difficulty, weakness, light-headedness, numbness and headaches.   Psychiatric/Behavioral: Negative for behavioral problems, confusion, decreased concentration, dysphoric mood, sleep disturbance and suicidal  "ideas. The patient is not nervous/anxious and is not hyperactive.        Objective:     Vitals:    12/28/18 0806   BP: 103/63   Pulse: 77   Temp: 98.2 °F (36.8 °C)   TempSrc: Oral   SpO2: 99%   Weight: 75.7 kg (166 lb 14.2 oz)   Height: 5' 3" (1.6 m)   PainSc: 0-No pain     Body mass index is 29.56 kg/m².    Physical Exam   Constitutional: She is oriented to person, place, and time. She appears well-developed and well-nourished. She is cooperative. She does not have a sickly appearance. No distress.   HENT:   Head: Normocephalic and atraumatic.   Right Ear: Hearing, tympanic membrane, external ear and ear canal normal. No tenderness.   Left Ear: Hearing, tympanic membrane, external ear and ear canal normal. No tenderness.   Nose: Nose normal.   Mouth/Throat: Oropharynx is clear and moist.   Eyes: Conjunctivae and lids are normal. Pupils are equal, round, and reactive to light. Right eye exhibits no discharge. Left eye exhibits no discharge. Right conjunctiva is not injected. Left conjunctiva is not injected. No scleral icterus. Right eye exhibits normal extraocular motion. Left eye exhibits normal extraocular motion.   Neck: Normal range of motion. Neck supple. No JVD present. Carotid bruit is not present. No tracheal deviation and no edema present. No thyromegaly present.   Cardiovascular: Normal rate, regular rhythm, normal heart sounds and normal pulses. Exam reveals no friction rub.   No murmur heard.  Pulmonary/Chest: Effort normal and breath sounds normal. No accessory muscle usage. No respiratory distress. She has no wheezes. She has no rhonchi. She has no rales.   Abdominal: Soft. Bowel sounds are normal. She exhibits no distension, no abdominal bruit, no pulsatile midline mass and no mass. There is no hepatosplenomegaly. There is no tenderness. There is no rebound, no guarding, no CVA tenderness, no tenderness at McBurney's point and negative Gongora's sign.   Musculoskeletal: She exhibits no edema. "   Lymphadenopathy:        Head (right side): No submandibular, no preauricular and no posterior auricular adenopathy present.        Head (left side): No submandibular, no preauricular and no posterior auricular adenopathy present.     She has no cervical adenopathy.   Neurological: She is alert and oriented to person, place, and time. GCS eye subscore is 4. GCS verbal subscore is 5. GCS motor subscore is 6.   Skin: Skin is warm and dry. No ecchymosis and no rash noted. Rash is not maculopapular and not urticarial. She is not diaphoretic. No cyanosis or erythema. Nails show no clubbing.   Psychiatric: She has a normal mood and affect. Her speech is normal and behavior is normal. Thought content normal. Her mood appears not anxious. Her affect is not angry and not inappropriate. She does not exhibit a depressed mood.   Nursing note and vitals reviewed.      Assessment and Plan:   Jahaira was seen today for annual exam.    Diagnoses and all orders for this visit:    General medical exam  -     CBC auto differential; Future  -     Comprehensive metabolic panel; Future  -     Hemoglobin A1c; Future  -     Lipid panel; Future  -     TSH; Future  -     T4, free; Future    Uterine leiomyoma, unspecified location    Anemia, unspecified type        Follow-up in about 1 year (around 12/28/2019).    THIS NOTE WILL BE SHARED WITH THE PATIENT.

## 2019-01-12 ENCOUNTER — HOSPITAL ENCOUNTER (EMERGENCY)
Facility: HOSPITAL | Age: 29
Discharge: HOME | End: 2019-01-12
Attending: EMERGENCY MEDICINE
Payer: COMMERCIAL

## 2019-01-12 VITALS
SYSTOLIC BLOOD PRESSURE: 116 MMHG | OXYGEN SATURATION: 98 % | HEART RATE: 72 BPM | TEMPERATURE: 99 F | HEIGHT: 63 IN | WEIGHT: 166 LBS | BODY MASS INDEX: 29.41 KG/M2 | RESPIRATION RATE: 16 BRPM | DIASTOLIC BLOOD PRESSURE: 61 MMHG

## 2019-01-12 DIAGNOSIS — R33.9 URINARY RETENTION: Primary | ICD-10-CM

## 2019-01-12 LAB
B-HCG UR QL: NEGATIVE
BILIRUB UR QL STRIP: NEGATIVE
CLARITY UR: CLEAR
COLOR UR: NORMAL
CTP QC/QA: YES
GLUCOSE UR QL STRIP: NEGATIVE
HGB UR QL STRIP: NEGATIVE
KETONES UR QL STRIP: NEGATIVE
LEUKOCYTE ESTERASE UR QL STRIP: NEGATIVE
NITRITE UR QL STRIP: NEGATIVE
PH UR STRIP: 6 [PH] (ref 5–8)
PROT UR QL STRIP: NEGATIVE
SP GR UR STRIP: 1 (ref 1–1.03)
URN SPEC COLLECT METH UR: NORMAL
UROBILINOGEN UR STRIP-ACNC: NEGATIVE EU/DL

## 2019-01-12 PROCEDURE — 81025 URINE PREGNANCY TEST: CPT | Performed by: NURSE PRACTITIONER

## 2019-01-12 PROCEDURE — 51702 INSERT TEMP BLADDER CATH: CPT

## 2019-01-12 PROCEDURE — 81003 URINALYSIS AUTO W/O SCOPE: CPT

## 2019-01-12 PROCEDURE — 99283 EMERGENCY DEPT VISIT LOW MDM: CPT | Mod: 25

## 2019-01-12 NOTE — ED PROVIDER NOTES
Encounter Date: 2019       History     Chief Complaint   Patient presents with    Urinary Retention     x1.5 hour; denies hx UTIs; reports some dribbling, but a lot of pressure with nothing coming out; denies back pain, but reports there was back pain earlier before the dribbling; denies dysuria; reports she had similar symptoms prior to being dx with enlarged uterus/fibroids     28-year-old female complaining of suprapubic pain and urinary retention.  She states that she has had 2 episodes of urinary retention in the past year.  She states that doctors have been unable to determine the definite cause of these episodes.  She does not take any daily medications.  The last time she was able to urinate normally was early yesterday morning.  She denies dysuria, flank pain, back pain, fever, nausea, vomiting.          Review of patient's allergies indicates:  No Known Allergies  History reviewed. No pertinent past medical history.  Past Surgical History:   Procedure Laterality Date    KNEE ARTHROSCOPY       Family History   Problem Relation Age of Onset    Breast cancer Paternal Aunt     Miscarriages / Stillbirths Mother     Hypertension Father     Miscarriages / Stillbirths Sister      labor Sister     Diabetes Maternal Grandfather     Colon cancer Neg Hx     Eclampsia Neg Hx     Ovarian cancer Neg Hx     Stroke Neg Hx      Social History     Tobacco Use    Smoking status: Never Smoker    Smokeless tobacco: Never Used   Substance Use Topics    Alcohol use: No    Drug use: No     Review of Systems   Constitutional: Negative for chills, fatigue and fever.   HENT: Negative for congestion, ear pain, nosebleeds, postnasal drip, rhinorrhea, sinus pressure and sore throat.    Eyes: Negative for photophobia and pain.   Respiratory: Negative for apnea, cough, choking, chest tightness, shortness of breath, wheezing and stridor.    Cardiovascular: Negative for chest pain, palpitations and leg swelling.    Gastrointestinal: Negative for abdominal pain, constipation, diarrhea, nausea and vomiting.   Genitourinary: Positive for difficulty urinating and pelvic pain. Negative for dysuria, flank pain, hematuria and vaginal discharge.   Musculoskeletal: Negative for arthralgias, back pain, gait problem and neck pain.   Skin: Negative for color change, pallor, rash and wound.   Neurological: Negative for dizziness, seizures, syncope, facial asymmetry, light-headedness and headaches.   Hematological: Negative for adenopathy.   Psychiatric/Behavioral: Negative for confusion. The patient is not nervous/anxious.        Physical Exam     Initial Vitals [01/12/19 0336]   BP Pulse Resp Temp SpO2   (!) 111/56 76 14 98.2 °F (36.8 °C) 96 %      MAP       --         Physical Exam    Nursing note and vitals reviewed.  Constitutional: Vital signs are normal. She appears well-developed and well-nourished. She is not diaphoretic. She is active and cooperative.  Non-toxic appearance. She does not have a sickly appearance. She does not appear ill. She appears distressed.   Patient is crying and appears very uncomfortable, or she is active, alert, and nontoxic   HENT:   Head: Normocephalic and atraumatic.   Right Ear: External ear normal.   Left Ear: External ear normal.   Nose: Nose normal.   Eyes: Conjunctivae and EOM are normal. Pupils are equal, round, and reactive to light. Right eye exhibits no discharge. Left eye exhibits no discharge. No scleral icterus.   Neck: Normal range of motion. Neck supple. No thyromegaly present. No tracheal deviation present. No JVD present.   Cardiovascular: Normal rate, regular rhythm, normal heart sounds and intact distal pulses. Exam reveals no gallop and no friction rub.    No murmur heard.  Pulmonary/Chest: Breath sounds normal. No stridor. No respiratory distress. She has no wheezes. She has no rhonchi. She has no rales.   Abdominal: Soft. Bowel sounds are normal. She exhibits no distension and no  mass. There is tenderness in the suprapubic area. There is no rigidity, no rebound, no guarding, no CVA tenderness, no tenderness at McBurney's point and negative Gongora's sign.   Musculoskeletal: Normal range of motion. She exhibits no edema or tenderness.   Lymphadenopathy:     She has no cervical adenopathy.   Neurological: She is alert and oriented to person, place, and time. She has normal strength and normal reflexes. She displays normal reflexes. No cranial nerve deficit or sensory deficit.   Skin: Skin is warm and dry. No rash and no abscess noted. No erythema. No pallor.   Psychiatric: She has a normal mood and affect. Her behavior is normal. Judgment and thought content normal.         ED Course   Procedures  Labs Reviewed   URINALYSIS, REFLEX TO URINE CULTURE    Narrative:     Preferred Collection Type->Urine, Catheterized   POCT URINE PREGNANCY          Imaging Results    None          Medical Decision Making:   History:   Old Medical Records: I decided to obtain old medical records.  Differential Diagnosis:   Urinary retention, UTI, pyelonephritis, urosepsis, others  ED Management:  Patient very uncomfortable and in a great deal of pain upon initial examination. Placement of Hodges catheter provided near immediate relief of the patient's symptoms. Returned greater than 900 mL of urine.  Urinalysis is unremarkable and shows no evidence of infection.  Uncertain cause of patient's symptoms, however patient is asymptomatic and completely pain-free prior to discharge. Will convert Hodges to leg bag for discharge. Advised patient to follow up with urology for re-evaluation and further management.  ED return precautions given. All questions regarding diagnosis and plan were answered to the patient's fullest possible satisfaction. Patient expressed understanding of diagnosis, discharge instructions, and return precautions.    Other:   I have discussed this case with another health care provider.       <> Summary  of the Discussion: Case discussed with my attending physician who agreed with the above assessment and plan.              Attending Attestation:     Physician Attestation Statement for NP/PA:   I discussed this assessment and plan of this patient with the NP/PA, but I did not personally examine the patient. The face to face encounter was performed by the NP/PA.                     Clinical Impression:   The encounter diagnosis was Urinary retention.      Disposition:   Disposition: Discharged  Condition: Stable                        Con Peng NP  01/12/19 0515       Smith Leal MD  01/12/19 2002

## 2019-01-12 NOTE — DISCHARGE INSTRUCTIONS
Follow-up with Urology as soon as possible as discussed.    Return to the emergency department for any new or worsening symptoms or as needed for any additional concerns.

## 2019-01-12 NOTE — ED NOTES
Instructed patient on urine specimen collection and patient verbalized understanding of instructions but unable to urinate at this time  
Urinary leg bag placed on patient and patient instructed on care of maravilla and draining of leg bag.  Patient verbalized understanding of instructions.  
yes

## 2019-01-12 NOTE — ED TRIAGE NOTES
Patient arrived to ED with c/o LLQ and RLQ abd pain, bilateral flank pain, and difficulty urinating x 1 hour.  Denies n/v, diarrhea, or fever.  No acute distress noted.

## 2019-01-14 ENCOUNTER — HOSPITAL ENCOUNTER (OUTPATIENT)
Dept: RADIOLOGY | Facility: HOSPITAL | Age: 29
Discharge: HOME | End: 2019-01-14
Attending: UROLOGY
Payer: COMMERCIAL

## 2019-01-14 ENCOUNTER — OFFICE VISIT (OUTPATIENT)
Dept: UROLOGY | Facility: CLINIC | Age: 29
End: 2019-01-14
Payer: COMMERCIAL

## 2019-01-14 VITALS
SYSTOLIC BLOOD PRESSURE: 100 MMHG | WEIGHT: 163 LBS | HEART RATE: 75 BPM | HEIGHT: 63 IN | BODY MASS INDEX: 28.88 KG/M2 | DIASTOLIC BLOOD PRESSURE: 55 MMHG

## 2019-01-14 DIAGNOSIS — R33.9 INCOMPLETE BLADDER EMPTYING: ICD-10-CM

## 2019-01-14 DIAGNOSIS — R33.9 URINARY RETENTION: ICD-10-CM

## 2019-01-14 PROCEDURE — 99999 PR PBB SHADOW E&M-EST. PATIENT-LVL IV: CPT | Mod: PBBFAC,,, | Performed by: UROLOGY

## 2019-01-14 PROCEDURE — 99245 PR OFFICE CONSULTATION,LEVEL V: ICD-10-PCS | Mod: S$GLB,,, | Performed by: UROLOGY

## 2019-01-14 PROCEDURE — 76770 US EXAM ABDO BACK WALL COMP: CPT | Mod: 26,,, | Performed by: RADIOLOGY

## 2019-01-14 PROCEDURE — 76770 US EXAM ABDO BACK WALL COMP: CPT | Mod: TC

## 2019-01-14 PROCEDURE — 99999 PR PBB SHADOW E&M-EST. PATIENT-LVL IV: ICD-10-PCS | Mod: PBBFAC,,, | Performed by: UROLOGY

## 2019-01-14 PROCEDURE — 76770 US RETROPERITONEAL COMPLETE: ICD-10-PCS | Mod: 26,,, | Performed by: RADIOLOGY

## 2019-01-14 PROCEDURE — 99245 OFF/OP CONSLTJ NEW/EST HI 55: CPT | Mod: S$GLB,,, | Performed by: UROLOGY

## 2019-01-14 RX ORDER — BETHANECHOL CHLORIDE 25 MG/1
25 TABLET ORAL 3 TIMES DAILY
Qty: 100 TABLET | Refills: 6 | Status: SHIPPED | OUTPATIENT
Start: 2019-01-14

## 2019-01-14 RX ORDER — SULFAMETHOXAZOLE AND TRIMETHOPRIM 800; 160 MG/1; MG/1
1 TABLET ORAL 2 TIMES DAILY
Qty: 20 TABLET | Refills: 0 | Status: SHIPPED | OUTPATIENT
Start: 2019-01-14 | End: 2019-01-24

## 2019-01-14 NOTE — LETTER
January 14, 2019      Chano Dee MD  [address] [facility]  [address]  [place] [address]           [place] [facility]  [address] [facility]  [place] [address]  Phone: [phone]          Patient: Jahaira Evangelista   MR Number: [id number]   YOB: 1990   Date of Visit: 1/14/2019       Dear Dr. Chano Dee:    Thank you for referring Jahaira Evangelista to me for evaluation. Attached you will find relevant portions of my assessment and plan of care.    If you have questions, please do not hesitate to call me. I look forward to following Jahaira Evangelista along with you.    Sincerely,    Jean Dumont MD    Enclosure  CC:  No Recipients    If you would like to receive this communication electronically, please contact [email or web address] or [phone] to request more information on [employer] Link access.    For providers and/or their staff who would like to refer a patient to [facility], please contact us through our one-stop-shop provider referral line, [facility], at [phone].    If you feel you have received this communication in error or would no longer like to receive these types of communications, please e-mail [email or web address]

## 2019-01-14 NOTE — PROGRESS NOTES
CC: went to ER on 19 for recurrent urinary retention    Jahaira Evangelista is a 28 y.o. woman who is here for the evaluation of Urinary Retention (currently with a maravilla)    A new pt referred by her PCP, Dr. Chano Dee.  She is here with her mother.    complaining of suprapubic pain and urinary retention.  She states that she has had 2 episodes of urinary retention in the past year.  She states that doctors have been unable to determine the definite cause of these episodes.  She does not take any daily medications.  The last time she was able to urinate normally was early yesterday morning.  She denies dysuria, flank pain, back pain, fever, nausea, vomiting.  She had similar episode when she was traveling in  in December.  At that time she had 1 liter of urinary retention.  After she wanted them to remove a catheter, she again developed urinary retention.    Prior to recurrent problem with urinary retention, she was able to void well.  Urination symptoms: Negative for frequency, urgency and incontinence..  Denies flank pain, dysuira, hematuria     She is an  working for [employer] in .      Past Medical History:   Diagnosis Date    Fibroids      Past Surgical History:   Procedure Laterality Date    KNEE ARTHROSCOPY       Social History     Tobacco Use    Smoking status: Never Smoker    Smokeless tobacco: Never Used   Substance Use Topics    Alcohol use: No    Drug use: No     Family History   Problem Relation Age of Onset    Breast cancer Paternal Aunt     Miscarriages / Stillbirths Mother     Hypertension Father     Miscarriages / Stillbirths Sister      labor Sister     Diabetes Maternal Grandfather     Colon cancer Neg Hx     Eclampsia Neg Hx     Ovarian cancer Neg Hx     Stroke Neg Hx      Allergy:  Review of patient's allergies indicates:  No Known Allergies  Outpatient Encounter Medications as of 2019   Medication Sig Dispense Refill    bethanechol  (URECHOLINE) 25 MG Tab Take 1 tablet (25 mg total) by mouth 3 (three) times daily. 100 tablet 6    sulfamethoxazole-trimethoprim 800-160mg (BACTRIM DS) 800-160 mg Tab Take 1 tablet by mouth 2 (two) times daily. for 10 days 20 tablet 0     No facility-administered encounter medications on file as of 1/14/2019.      Review of Systems   ROS  Physical Exam     Vitals:    01/14/19 0934   BP: (!) 100/55   Pulse: 75     Physical Exam   Genitourinary:   Genitourinary Comments: A  Indwelling maravilla catheter with a leg bag to the left leg.         LABS:  Lab Results   Component Value Date    CREATININE 0.8 12/28/2018    CREATININE 0.7 12/27/2017     No results found for: LABURIN  Radiology:  Urine fibroid per OB us.    Assessment and Plan:  Jahaira was seen today for urinary retention.    Diagnoses and all orders for this visit:    Urinary retention  -     US Retroperitoneal Complete (Kidney and; Future  -     Simple Urodynamics w/ Cysto; Future  -     Ambulatory Referral to Neurology  -     bethanechol (URECHOLINE) 25 MG Tab; Take 1 tablet (25 mg total) by mouth 3 (three) times daily.  -     sulfamethoxazole-trimethoprim 800-160mg (BACTRIM DS) 800-160 mg Tab; Take 1 tablet by mouth 2 (two) times daily. for 10 days    Incomplete bladder emptying  -     US Retroperitoneal Complete (Kidney and; Future  -     Simple Urodynamics w/ Cysto; Future  -     Ambulatory Referral to Neurology  -     bethanechol (URECHOLINE) 25 MG Tab; Take 1 tablet (25 mg total) by mouth 3 (three) times daily.    explained that nature of her urinary retention.  Further evaluation as above.  Neurology consult to r/o Multiple sclerosis or any other neurologic deficit.    Start urecholine.  She can take bactrim night before SUDS cysto.  Will teach her how to do CIC if she fails to void well.  For now, recommend to keep an indwelling Maravilla to help resting the bladder.  All questions answered.    Follow-up:  Follow-up for suds cysto.

## 2019-01-16 ENCOUNTER — PATIENT MESSAGE (OUTPATIENT)
Dept: UROLOGY | Facility: CLINIC | Age: 29
End: 2019-01-16

## 2019-01-16 ENCOUNTER — OFFICE VISIT (OUTPATIENT)
Dept: NEUROLOGY | Facility: CLINIC | Age: 29
End: 2019-01-16
Payer: COMMERCIAL

## 2019-01-16 VITALS
DIASTOLIC BLOOD PRESSURE: 70 MMHG | HEIGHT: 63 IN | WEIGHT: 158 LBS | SYSTOLIC BLOOD PRESSURE: 129 MMHG | HEART RATE: 76 BPM | BODY MASS INDEX: 28 KG/M2

## 2019-01-16 DIAGNOSIS — D25.9 UTERINE LEIOMYOMA, UNSPECIFIED LOCATION: ICD-10-CM

## 2019-01-16 DIAGNOSIS — R33.9 URINARY RETENTION: Primary | ICD-10-CM

## 2019-01-16 PROCEDURE — 99204 PR OFFICE/OUTPT VISIT, NEW, LEVL IV, 45-59 MIN: ICD-10-PCS | Mod: S$GLB,,, | Performed by: STUDENT IN AN ORGANIZED HEALTH CARE EDUCATION/TRAINING PROGRAM

## 2019-01-16 PROCEDURE — 3008F BODY MASS INDEX DOCD: CPT | Mod: CPTII,S$GLB,, | Performed by: STUDENT IN AN ORGANIZED HEALTH CARE EDUCATION/TRAINING PROGRAM

## 2019-01-16 PROCEDURE — 99999 PR PBB SHADOW E&M-EST. PATIENT-LVL IV: ICD-10-PCS | Mod: PBBFAC,,, | Performed by: STUDENT IN AN ORGANIZED HEALTH CARE EDUCATION/TRAINING PROGRAM

## 2019-01-16 PROCEDURE — 99999 PR PBB SHADOW E&M-EST. PATIENT-LVL IV: CPT | Mod: PBBFAC,,, | Performed by: STUDENT IN AN ORGANIZED HEALTH CARE EDUCATION/TRAINING PROGRAM

## 2019-01-16 PROCEDURE — 3008F PR BODY MASS INDEX (BMI) DOCUMENTED: ICD-10-PCS | Mod: CPTII,S$GLB,, | Performed by: STUDENT IN AN ORGANIZED HEALTH CARE EDUCATION/TRAINING PROGRAM

## 2019-01-16 PROCEDURE — 99204 OFFICE O/P NEW MOD 45 MIN: CPT | Mod: S$GLB,,, | Performed by: STUDENT IN AN ORGANIZED HEALTH CARE EDUCATION/TRAINING PROGRAM

## 2019-01-17 ENCOUNTER — PROCEDURE VISIT (OUTPATIENT)
Dept: UROLOGY | Facility: CLINIC | Age: 29
End: 2019-01-17
Payer: COMMERCIAL

## 2019-01-17 VITALS
DIASTOLIC BLOOD PRESSURE: 56 MMHG | TEMPERATURE: 98 F | HEIGHT: 63 IN | WEIGHT: 162 LBS | HEART RATE: 73 BPM | BODY MASS INDEX: 28.7 KG/M2 | SYSTOLIC BLOOD PRESSURE: 113 MMHG

## 2019-01-17 DIAGNOSIS — R33.9 URINARY RETENTION: ICD-10-CM

## 2019-01-17 DIAGNOSIS — R33.9 INCOMPLETE BLADDER EMPTYING: ICD-10-CM

## 2019-01-17 PROCEDURE — 51784 PR ANAL/URINARY MUSCLE STUDY: ICD-10-PCS | Mod: 51,S$GLB,, | Performed by: UROLOGY

## 2019-01-17 PROCEDURE — 51728 CYSTOMETROGRAM W/VP: CPT | Mod: S$GLB,,, | Performed by: UROLOGY

## 2019-01-17 PROCEDURE — 51741 PR UROFLOWMETRY, COMPLEX: ICD-10-PCS | Mod: 51,S$GLB,, | Performed by: UROLOGY

## 2019-01-17 PROCEDURE — 51728 PR COMPLEX CYSTOMETROGRAM VOIDING PRESSURE STUDIES: ICD-10-PCS | Mod: S$GLB,,, | Performed by: UROLOGY

## 2019-01-17 PROCEDURE — 51741 ELECTRO-UROFLOWMETRY FIRST: CPT | Mod: 51,S$GLB,, | Performed by: UROLOGY

## 2019-01-17 PROCEDURE — 52000 CYSTOURETHROSCOPY: CPT | Mod: 59,S$GLB,, | Performed by: UROLOGY

## 2019-01-17 PROCEDURE — 52000 PR CYSTOURETHROSCOPY: ICD-10-PCS | Mod: 59,S$GLB,, | Performed by: UROLOGY

## 2019-01-17 PROCEDURE — 51784 ANAL/URINARY MUSCLE STUDY: CPT | Mod: 51,S$GLB,, | Performed by: UROLOGY

## 2019-01-17 PROCEDURE — 51797 INTRAABDOMINAL PRESSURE TEST: CPT | Mod: S$GLB,,, | Performed by: UROLOGY

## 2019-01-17 PROCEDURE — 51797 PR VOIDING PRESS STUDY INTRA-ABDOMINAL VOID: ICD-10-PCS | Mod: S$GLB,,, | Performed by: UROLOGY

## 2019-01-17 NOTE — PROCEDURES
Simple Urodynamics w/ Cysto  Date/Time: 1/17/2019 12:12 PM  Performed by: Jean Dumont MD  Authorized by: Jean Dumont MD   Comments: Procedure Date:  01/17/2019    Procedure:   Diagnostic Cystourethroscopy   Complex Cystometrogram   Voiding / Pressure Study with Intrarectal Balloon   Complex Uroflow   Electromyogram of Anal Sphincter.     Pre-OP Diagnosis:   Urinary retetnion   Post-OP Diagnosis:   same   Anesthesia:   Anesthesia Administered:   Intraurethral instillation of 10 mL 2% lidocaine (Xylocaine) jelly.   Findings:   --- Bladder ---   CYSTOMETROGRAM ( Filling Phase ):   Cystometric Numeric Data:   - First Desire (Sensation): 140 mL at 10cm of water.   - Normal Desire: 195mL at 12 cm of water.   - Strong Desire: 249 mL at 14 cm of water.   - Urgency (Imminent Void) : 359 mL at 22cm of water.   - Maximum Cystometric Capacity: 529 mL.   Compliance:   - normal.   Leak Point Pressure:   - Valsalva ( Abdominal ) Leak Point Pressure: none.   UROFLOW:   - Voided Volume: 573 mL.   - Residual Urine: 30 mL.   - Maximum Flow Rate: 50 mL/sec.   - Flow Pattern: normal  VOIDING PRESSURE STUDY ( Voiding Phase ):   Detrusor Pressure:   - Maximum Detrusor Pressure: 88cm of water.   - Detrusor Pressure at Maximum Flow: 55 cm of water.   - Detrusor Contraction Characteristics: Sustained contraction(s).   ELECTROMYOGRAM:   - incomplete relaxation.     ---Diagnostic Cystourethroscopy ---   Normal urethra.   Width of Bladder Neck Opening: Approximately 18 Fr.   Normal bladder.   Normal ureteral orifices bilaterally.       Description of Procedure:   Informed Consent:   - Risks, benefits and alternatives of procedure discussed with   patient and informed consent obtained.   Patient Position:   - Supine.   --- Bladder ---   Prep and Drape:   - Patient prepped and draped in usual sterile fashion using povidone   iodine (Betadine).   --- Diagnostic Cystourethroscopy ---   Instruments:   - 16 Fr flexible cystoscope with 0 degree  lens.   Procedure Details:   - Cystoscope passed under vision into bladder.   - Bladder and urethra examined in their entirety with findings as   above.   --- Urodynamic Studies ---   Procedure Details:   Cystometrogram:   - Catheter(s) passed into the bladder.   - Rectal balloon inserted.   - Catheter(s) connected to infusion medium and to pressure recording   device.   - Infusion Rate: 30 mL / min.   Electromyogram:   - Perineal electromyogram pad placed and connected to electromyogram   recording device.   Equipment:   - Catheters: Double lumen catheter.   - Medium: Liquid.   - Pressure Recording Device: Calibrated electronic equipment.   Complications:   No immediate complications.    CONCLUSIONS:   1. Incomplete relaxation of the external sphincter  2. No well defined normal detrusor contraction ( bell shape curve)    Continue urecholine until her urination is completely normalized.  Follow up with neurology for further evaluation   She was able to void spontaneously and completely today.    Post-OP Plan:   Patient was discharged home in a stable condition.  Medications: cipro  Follow up:  As needed

## 2019-01-17 NOTE — PATIENT INSTRUCTIONS
SIMPLE URODYNAMIC STUDY (SUDS) & CYSTOSCOPY  UROLOGY CLINIC DISCHARGE INSTRUCTIONS    You have had a procedure that will require time to properly heal. Follow the instructions you have been given on how to care for yourself once you are home. Below is additional information to help in your recovery.    ACTIVITY  · There are no restrictions in activity. Start doing again the things you did before the procedure.  · You may experience a slight burning sensation. You may notice a small amount of blood in your urine. This will clear up within a day. Call the clinic if this continues beyond 48 hours.    DIET  · Continue your normal diet. You may eat the same foods you ate before your procedure.  · Drink plenty of fluids during the first 24-48 hours following your procedure.    MEDICATIONS  · Resume all other previous medications from your prescribing physician.  · Continue any pre=procedure antibiotics until they are all gone.    SIGNS AND SYMPTOMS TO REPORT TO THE DOCTOR  · Chills or fever greater than 101° F within 24 hours of procedure.  · Changes in urination, such as increased bleeding, foul smell, cloudy urine, or painful urination.  · Call your doctor with any questions or concerns.    For any emergency situation, call 1 immediately or go to your nearest emergency room.    [facility]  [phone]    _                                                                                                                                                                                             If any problems after hours or weekends, you may call [phone] and ask for the urology resident on call.

## 2019-01-20 NOTE — PROGRESS NOTES
Patient Name: Jahaira Evangelista  MRN: [id number]    CC: Intermittent urinary retention    HPI: Jahaira Evangelista is a 28 y.o. female with no significant past history who presents for evaluation of episodes of urinary retention. She comes to clinic today with a maravilla in place.    At baseline, patient has no medical conditions or take any meds.  First episode - Feb 2018 - Preceded by a night of drinking (2-3 glasses of wine) . The next day she woke up with severe abdominal and pelvic pain. Reports that she had the urge to void but could not. She tried OTC painkillers and local creams but with no relief. After about 10 hrs, she spontaneously voided. During this episode, she did not go to the ED or see a doctor.    Second episode - Nov/Dec 2018 - Was with her friend in [place] sightseeing when she felt the urge to void but again could not. Started to developed pelvic pain that rapidly increased in intensity could not void no matter how much she tried. She presented to an ED and an in-and-out cath was done during which a litre of urine came out.     Third Episode - Jan 2019 - Developed sudden pelvic pain but was unable to void yet again. She presented to [facility] ED where a Maravilla was placed and 900ml of urine was removed. The Maravilla was left in this time and she was asked to follow up with Ob/Gyn.    She denies any visual changes/numbness/weakness.    Underwent transabdominal US on 12/14 which showed an uterine fibroid. She saw Ob/Gyn on 12/24 and was told the fibroid may be the cause of her issues. Saw a urologist on 1/14 and is scheduled to undergo urodynamics study with cystoscopy on 1/17.    Denies tobacco use. Denies IVDU.  Drinks alcohol socially.    Past Medical History  Past Medical History:   Diagnosis Date    Fibroids        Medications    Current Outpatient Medications:     bethanechol (URECHOLINE) 25 MG Tab, Take 1 tablet (25 mg total) by mouth 3 (three) times daily., Disp: 100 tablet, Rfl: 6     "sulfamethoxazole-trimethoprim 800-160mg (BACTRIM DS) 800-160 mg Tab, Take 1 tablet by mouth 2 (two) times daily. for 10 days, Disp: 20 tablet, Rfl: 0    Allergies  Review of patient's allergies indicates:  No Known Allergies    Social History  Social History     Socioeconomic History    Marital status: Single     Spouse name: Not on file    Number of children: Not on file    Years of education: Not on file    Highest education level: Not on file   Social Needs    Financial resource strain: Not on file    Food insecurity - worry: Not on file    Food insecurity - inability: Not on file    Transportation needs - medical: Not on file    Transportation needs - non-medical: Not on file   Occupational History    Not on file   Tobacco Use    Smoking status: Never Smoker    Smokeless tobacco: Never Used   Substance and Sexual Activity    Alcohol use: No    Drug use: No    Sexual activity: Yes     Partners: Male   Other Topics Concern    Not on file   Social History Narrative    Not on file       Family History  Family History   Problem Relation Age of Onset    Breast cancer Paternal Aunt     Miscarriages / Stillbirths Mother     Hypertension Father     Miscarriages / Stillbirths Sister      labor Sister     Diabetes Maternal Grandfather     Colon cancer Neg Hx     Eclampsia Neg Hx     Ovarian cancer Neg Hx     Stroke Neg Hx      Review of Systems   Constitutional: Negative for fever.   Eyes: Negative for blurred vision and double vision.   Genitourinary:        3 episodes of urinary retention   Neurological: Negative for dizziness, sensory change and focal weakness.       Physical Exam  /70   Pulse 76   Ht 5' 3" (1.6 m)   Wt 71.7 kg (158 lb)   LMP 2019   BMI 27.99 kg/m²     Physical Exam   Constitutional: She is oriented to person, place, and time. No distress.   Eyes: EOM are normal. Pupils are equal, round, and reactive to light.   Cardiovascular: Normal rate. "   Pulmonary/Chest: Effort normal.   Genitourinary:   Genitourinary Comments: Hodges in place+   Musculoskeletal: Normal range of motion.   Neurological: She is alert and oriented to person, place, and time.   Nursing note and vitals reviewed.        Constitutional  Well-developed, well-nourished, appears stated age   Ophthalmoscopic  No papilledema with no hemorrhages or exudates bilaterally   Cardiovascular  Radial pulses 2+ and symmetric, no LE edema bilaterally   Neurological    * Mental status      - Orientation  Oriented to person, place, time, and situation     - Memory   Intact recent and remote     - Attention/concentration  Attentive, vigilant during exam     - Language  Naming & repetition intact, +2-step commands     - Fund of knowledge  Aware of current events     - Executive  Well-organized thoughts     - Other     * Cranial nerves       - CN II  PERRL, visual fields full to confrontation     - CN III, IV, VI  Extraocular movements full, normal pursuits and saccades     - CN V  Sensation V1 - V3 intact     - CN VII  Face strong and symmetric bilaterally     - CN VIII  Hearing intact bilaterally     - CN IX, X  Palate raises midline and symmetric     - CN XI  SCM and trapezius 5/5 bilaterally     - CN XII  Tongue midline   * Motor  Muscle bulk normal, strength 5/5 throughout   * Sensory   Intact to temperature and vibration throughout   * Coordination  No dysmetria with finger-to-nose or heel-to-shin   * Gait  Normal   * Deep tendon reflexes  2+ and symmetric throughout   Babinski downgoing bilaterally       Lab and Test Results    WBC   Date Value Ref Range Status   12/28/2018 4.84 3.90 - 12.70 K/uL Final   12/27/2017 4.13 3.90 - 12.70 K/uL Final     Hemoglobin   Date Value Ref Range Status   12/28/2018 10.3 (L) 12.0 - 16.0 g/dL Final   12/27/2017 10.2 (L) 12.0 - 16.0 g/dL Final     Hematocrit   Date Value Ref Range Status   12/28/2018 33.9 (L) 37.0 - 48.5 % Final   12/27/2017 32.0 (L) 37.0 - 48.5 %  Final     Platelets   Date Value Ref Range Status   12/28/2018 289 150 - 350 K/uL Final   12/27/2017 258 150 - 350 K/uL Final     Glucose   Date Value Ref Range Status   12/28/2018 84 70 - 110 mg/dL Final   12/27/2017 90 70 - 110 mg/dL Final     Sodium   Date Value Ref Range Status   12/28/2018 137 136 - 145 mmol/L Final   12/27/2017 138 136 - 145 mmol/L Final     Potassium   Date Value Ref Range Status   12/28/2018 4.7 3.5 - 5.1 mmol/L Final   12/27/2017 4.7 3.5 - 5.1 mmol/L Final     Chloride   Date Value Ref Range Status   12/28/2018 108 95 - 110 mmol/L Final   12/27/2017 107 95 - 110 mmol/L Final     CO2   Date Value Ref Range Status   12/28/2018 25 23 - 29 mmol/L Final   12/27/2017 27 23 - 29 mmol/L Final     BUN, Bld   Date Value Ref Range Status   12/28/2018 12 6 - 20 mg/dL Final   12/27/2017 9 6 - 20 mg/dL Final     Creatinine   Date Value Ref Range Status   12/28/2018 0.8 0.5 - 1.4 mg/dL Final   12/27/2017 0.7 0.5 - 1.4 mg/dL Final     Calcium   Date Value Ref Range Status   12/28/2018 10.4 8.7 - 10.5 mg/dL Final   12/27/2017 9.8 8.7 - 10.5 mg/dL Final     Alkaline Phosphatase   Date Value Ref Range Status   12/28/2018 51 (L) 55 - 135 U/L Final   12/27/2017 39 (L) 55 - 135 U/L Final     ALT   Date Value Ref Range Status   12/28/2018 22 10 - 44 U/L Final   12/27/2017 22 10 - 44 U/L Final     AST   Date Value Ref Range Status   12/28/2018 35 10 - 40 U/L Final   12/27/2017 27 10 - 40 U/L Final         Images:   CT Abdomen 1/14/19:  No significant sonographic abnormality of the kidneys.  The urinary bladder is decompressed around a Hodges catheter.    Incidentally seen is an enlarged uterus, incompletely evaluated on this examination.    Assessment and Plan    Jahaira Evangelista is a 28 y.o. female with episodic urinary retention with significant amounts removed each time Hodges was placed. She has no associated neurological symptoms and no family history of neurological disorders.     Problem List Items Addressed  This Visit        Renal/    Uterine leiomyoma    Current Assessment & Plan     -- F/u Ob/Gyn         Urinary retention - Primary    Current Assessment & Plan     28 yr old with episodes of urinary retention and no clear triggers. She has no associated neurological symptoms. On placement of Hodges, a significant amount of urine was drained the second and third time.    Concern at this point would be demyelinating disease.    -- MRI brain and spinal axis W WO contrast  -- Follow up after imaging to discuss results and further options.  -- F/u with Urology for urodynamic testing.         Relevant Orders    MRI Brain W WO Contrast    MRI Spine Cervical-Thoracic-Lumbar W W/O Contrast (XPD)            Emigdio Danielson MD  Neurology Resident   [facility] [address] [place] [address]  Pager: [phone]

## 2019-10-01 NOTE — ASSESSMENT & PLAN NOTE
-- F/u Ob/Gyn  
28 yr old with episodes of urinary retention and no clear triggers. She has no associated neurological symptoms. On placement of Hodges, a significant amount of urine was drained the second and third time.    Concern at this point would be demyelinating disease.    -- MRI brain and spinal axis W WO contrast  -- Follow up after imaging to discuss results and further options.  -- F/u with Urology for urodynamic testing.  
Standing/Walking/Toileting

## 2019-10-20 ENCOUNTER — HOSPITAL ENCOUNTER (EMERGENCY)
Age: 29
Discharge: HOME | End: 2019-10-20
Attending: EMERGENCY MEDICINE
Payer: COMMERCIAL

## 2019-10-20 ENCOUNTER — APPOINTMENT (OUTPATIENT)
Dept: CT IMAGING | Age: 29
End: 2019-10-20
Payer: COMMERCIAL

## 2019-10-20 VITALS
DIASTOLIC BLOOD PRESSURE: 61 MMHG | WEIGHT: 165 LBS | HEIGHT: 63 IN | RESPIRATION RATE: 12 BRPM | HEART RATE: 80 BPM | OXYGEN SATURATION: 98 % | SYSTOLIC BLOOD PRESSURE: 101 MMHG | TEMPERATURE: 98.3 F | BODY MASS INDEX: 29.23 KG/M2

## 2019-10-20 DIAGNOSIS — R10.9 ABDOMINAL PAIN, UNSPECIFIED ABDOMINAL LOCATION: Primary | ICD-10-CM

## 2019-10-20 LAB
-: ABNORMAL
ABSOLUTE EOS #: 0.1 K/UL (ref 0–0.4)
ABSOLUTE IMMATURE GRANULOCYTE: ABNORMAL K/UL (ref 0–0.3)
ABSOLUTE LYMPH #: 0.8 K/UL (ref 1–4.8)
ABSOLUTE MONO #: 0.4 K/UL (ref 0.1–1.2)
AMORPHOUS: ABNORMAL
ANION GAP SERPL CALCULATED.3IONS-SCNC: 12 MMOL/L (ref 9–17)
BACTERIA: ABNORMAL
BASOPHILS # BLD: 1 % (ref 0–2)
BASOPHILS ABSOLUTE: 0 K/UL (ref 0–0.2)
BILIRUBIN URINE: NEGATIVE
BUN BLDV-MCNC: 9 MG/DL (ref 6–20)
BUN/CREAT BLD: ABNORMAL (ref 9–20)
CALCIUM SERPL-MCNC: 11.1 MG/DL (ref 8.6–10.4)
CASTS UA: ABNORMAL /LPF
CHLORIDE BLD-SCNC: 99 MMOL/L (ref 98–107)
CO2: 26 MMOL/L (ref 20–31)
COLOR: YELLOW
COMMENT UA: ABNORMAL
CREAT SERPL-MCNC: 0.64 MG/DL (ref 0.5–0.9)
CRYSTALS, UA: ABNORMAL /HPF
DIFFERENTIAL TYPE: ABNORMAL
EOSINOPHILS RELATIVE PERCENT: 2 % (ref 1–4)
EPITHELIAL CELLS UA: ABNORMAL /HPF (ref 0–5)
GFR AFRICAN AMERICAN: >60 ML/MIN
GFR NON-AFRICAN AMERICAN: >60 ML/MIN
GFR SERPL CREATININE-BSD FRML MDRD: ABNORMAL ML/MIN/{1.73_M2}
GFR SERPL CREATININE-BSD FRML MDRD: ABNORMAL ML/MIN/{1.73_M2}
GLUCOSE BLD-MCNC: 100 MG/DL (ref 70–99)
GLUCOSE URINE: NEGATIVE
HCG QUALITATIVE: NEGATIVE
HCT VFR BLD CALC: 32.6 % (ref 36–46)
HEMOGLOBIN: 10.5 G/DL (ref 12–16)
IMMATURE GRANULOCYTES: ABNORMAL %
KETONES, URINE: NEGATIVE
LEUKOCYTE ESTERASE, URINE: NEGATIVE
LYMPHOCYTES # BLD: 16 % (ref 24–44)
MCH RBC QN AUTO: 26.3 PG (ref 26–34)
MCHC RBC AUTO-ENTMCNC: 32.3 G/DL (ref 31–37)
MCV RBC AUTO: 81.4 FL (ref 80–100)
MONOCYTES # BLD: 9 % (ref 2–11)
MUCUS: ABNORMAL
NITRITE, URINE: NEGATIVE
NRBC AUTOMATED: ABNORMAL PER 100 WBC
OTHER OBSERVATIONS UA: ABNORMAL
PDW BLD-RTO: 15.8 % (ref 12.5–15.4)
PH UA: 7 (ref 5–8)
PLATELET # BLD: 240 K/UL (ref 140–450)
PLATELET ESTIMATE: ABNORMAL
PMV BLD AUTO: 9 FL (ref 6–12)
POTASSIUM SERPL-SCNC: 4 MMOL/L (ref 3.7–5.3)
PROTEIN UA: ABNORMAL
RBC # BLD: 4.01 M/UL (ref 4–5.2)
RBC # BLD: ABNORMAL 10*6/UL
RBC UA: ABNORMAL /HPF (ref 0–2)
RENAL EPITHELIAL, UA: ABNORMAL /HPF
SEG NEUTROPHILS: 72 % (ref 36–66)
SEGMENTED NEUTROPHILS ABSOLUTE COUNT: 3.4 K/UL (ref 1.8–7.7)
SODIUM BLD-SCNC: 137 MMOL/L (ref 135–144)
SPECIFIC GRAVITY UA: 1.01 (ref 1–1.03)
TRICHOMONAS: ABNORMAL
TURBIDITY: ABNORMAL
URINE HGB: ABNORMAL
UROBILINOGEN, URINE: NORMAL
WBC # BLD: 4.7 K/UL (ref 3.5–11)
WBC # BLD: ABNORMAL 10*3/UL
WBC UA: ABNORMAL /HPF (ref 0–5)
YEAST: ABNORMAL

## 2019-10-20 PROCEDURE — 84703 CHORIONIC GONADOTROPIN ASSAY: CPT

## 2019-10-20 PROCEDURE — 74177 CT ABD & PELVIS W/CONTRAST: CPT

## 2019-10-20 PROCEDURE — 2580000003 HC RX 258: Performed by: EMERGENCY MEDICINE

## 2019-10-20 PROCEDURE — 99284 EMERGENCY DEPT VISIT MOD MDM: CPT

## 2019-10-20 PROCEDURE — 36415 COLL VENOUS BLD VENIPUNCTURE: CPT

## 2019-10-20 PROCEDURE — 85025 COMPLETE CBC W/AUTO DIFF WBC: CPT

## 2019-10-20 PROCEDURE — 6360000004 HC RX CONTRAST MEDICATION: Performed by: EMERGENCY MEDICINE

## 2019-10-20 PROCEDURE — 80048 BASIC METABOLIC PNL TOTAL CA: CPT

## 2019-10-20 PROCEDURE — 81001 URINALYSIS AUTO W/SCOPE: CPT

## 2019-10-20 RX ORDER — 0.9 % SODIUM CHLORIDE 0.9 %
1000 INTRAVENOUS SOLUTION INTRAVENOUS ONCE
Status: COMPLETED | OUTPATIENT
Start: 2019-10-20 | End: 2019-10-20

## 2019-10-20 RX ORDER — SODIUM CHLORIDE 0.9 % (FLUSH) 0.9 %
10 SYRINGE (ML) INJECTION PRN
Status: DISCONTINUED | OUTPATIENT
Start: 2019-10-20 | End: 2019-10-20 | Stop reason: HOSPADM

## 2019-10-20 RX ORDER — 0.9 % SODIUM CHLORIDE 0.9 %
80 INTRAVENOUS SOLUTION INTRAVENOUS ONCE
Status: COMPLETED | OUTPATIENT
Start: 2019-10-20 | End: 2019-10-20

## 2019-10-20 RX ADMIN — IOVERSOL 75 ML: 741 INJECTION INTRA-ARTERIAL; INTRAVENOUS at 12:43

## 2019-10-20 RX ADMIN — SODIUM CHLORIDE 1000 ML: 9 INJECTION, SOLUTION INTRAVENOUS at 11:15

## 2019-10-20 RX ADMIN — Medication 10 ML: at 12:43

## 2019-10-20 RX ADMIN — SODIUM CHLORIDE 80 ML: 9 INJECTION, SOLUTION INTRAVENOUS at 12:44

## 2019-10-20 RX ADMIN — IOHEXOL 50 ML: 240 INJECTION, SOLUTION INTRATHECAL; INTRAVASCULAR; INTRAVENOUS; ORAL at 11:18

## 2019-10-20 ASSESSMENT — PAIN DESCRIPTION - ORIENTATION: ORIENTATION: RIGHT;LOWER

## 2019-10-20 ASSESSMENT — PAIN SCALES - GENERAL: PAINLEVEL_OUTOF10: 8

## 2019-10-20 ASSESSMENT — PAIN DESCRIPTION - DESCRIPTORS: DESCRIPTORS: SHARP

## 2019-10-20 ASSESSMENT — PAIN DESCRIPTION - LOCATION: LOCATION: ABDOMEN

## 2020-10-08 ENCOUNTER — PATIENT MESSAGE (OUTPATIENT)
Dept: PRIMARY CARE CLINIC | Facility: CLINIC | Age: 30
End: 2020-10-08

## 2021-01-04 ENCOUNTER — PATIENT MESSAGE (OUTPATIENT)
Dept: ADMINISTRATIVE | Facility: HOSPITAL | Age: 31
End: 2021-01-04

## 2021-04-05 ENCOUNTER — PATIENT MESSAGE (OUTPATIENT)
Dept: ADMINISTRATIVE | Facility: HOSPITAL | Age: 31
End: 2021-04-05

## 2021-07-06 ENCOUNTER — PATIENT MESSAGE (OUTPATIENT)
Dept: ADMINISTRATIVE | Facility: HOSPITAL | Age: 31
End: 2021-07-06

## 2021-10-05 ENCOUNTER — PATIENT MESSAGE (OUTPATIENT)
Dept: ADMINISTRATIVE | Facility: HOSPITAL | Age: 31
End: 2021-10-05

## 2024-04-05 ENCOUNTER — HOSPITAL ENCOUNTER (EMERGENCY)
Age: 34
Discharge: HOME | End: 2024-04-05
Attending: EMERGENCY MEDICINE
Payer: COMMERCIAL

## 2024-04-05 VITALS
RESPIRATION RATE: 18 BRPM | WEIGHT: 156.53 LBS | TEMPERATURE: 98.6 F | OXYGEN SATURATION: 99 % | BODY MASS INDEX: 27.73 KG/M2 | DIASTOLIC BLOOD PRESSURE: 80 MMHG | HEART RATE: 81 BPM | SYSTOLIC BLOOD PRESSURE: 119 MMHG

## 2024-04-05 DIAGNOSIS — R21 RASH AND OTHER NONSPECIFIC SKIN ERUPTION: Primary | ICD-10-CM

## 2024-04-05 PROCEDURE — 99283 EMERGENCY DEPT VISIT LOW MDM: CPT

## 2024-04-05 RX ORDER — PERMETHRIN 50 MG/G
CREAM TOPICAL
Qty: 60 G | Refills: 0 | Status: SHIPPED | OUTPATIENT
Start: 2024-04-05

## 2024-04-05 ASSESSMENT — PAIN - FUNCTIONAL ASSESSMENT: PAIN_FUNCTIONAL_ASSESSMENT: NONE - DENIES PAIN

## 2024-04-05 NOTE — ED PROVIDER NOTES
[facility] ED  Emergency Department Encounter  Emergency Medicine Resident     Pt Name:Zoey Tolentino  MRN: [id number]  Birthdate 1990  Date of evaluation: 4/5/24  PCP:  No primary care provider on file.  Note Started: 6:19 PM EDT    CHIEF COMPLAINT       Chief Complaint   Patient presents with    Rash     HISTORY OF PRESENT ILLNESS  (Location/Symptom, Timing/Onset, Context/Setting, Quality, Duration, Modifying Factors, Severity.)      Zoey Tolentino is a 33 y.o. female who presents with ***    PAST MEDICAL / SURGICAL / SOCIAL / FAMILY HISTORY      has no past medical history on file.     has a past surgical history that includes Knee arthroscopy (Right, 2011).    Social History     Socioeconomic History    Marital status: Single     Spouse name: Not on file    Number of children: Not on file    Years of education: Not on file    Highest education level: Not on file   Occupational History    Not on file   Tobacco Use    Smoking status: Never    Smokeless tobacco: Never   Substance and Sexual Activity    Alcohol use: Never    Drug use: Never    Sexual activity: Never   Other Topics Concern    Not on file   Social History Narrative    Not on file     Social Determinants of Health     Financial Resource Strain: Not on file   Food Insecurity: Not on file   Transportation Needs: Not on file   Physical Activity: Not on file   Stress: Not on file   Social Connections: Not on file   Intimate Partner Violence: Not on file   Housing Stability: Not on file       History reviewed. No pertinent family history.    Allergies:  Patient has no known allergies.    Home Medications:  Prior to Admission medications    Medication Sig Start Date End Date Taking? Authorizing Provider   permethrin (ELIMITE) 5 % cream Apply topically as directed 4/5/24  Yes Marlon Valenzuela DO     REVIEW OF SYSTEMS       Review of Systems   Constitutional:  Negative for chills and fever.   Respiratory:  Negative for shortness of

## 2024-04-05 NOTE — ED NOTES
Dr. Valenzuela at bedside to evaluate pt.   Pt to ed with c/o ringworm. Pt states she has had lesions for the past week. Pt states rash is to her neck, arms, abdomen and armpits. Pt has been using an anti-fungal body wash and states it is improving the concerning areas.   Pt went to urgent care today, they prescribed her steroids. Pt states she came to ED as she does not want to take steroids if necessary as she does not like the way they make her feel.   Pt is alert, oriented, speaking in full, complete sentences. Pt denies pain.

## 2024-04-05 NOTE — ED PROVIDER NOTES
I performed a history and physical examination of the patient and discussed management with the resident. I reviewed the resident’s note and agree with the documented findings and plan of care. Any areas of disagreement are noted on the chart. I was personally present for the key portions of any procedures. I have documented in the chart those procedures where I was not present during the key portions. Unless noted in my documentation, I agree with the chief complaint, past medical history, past surgical history, allergies, medications, social and family history as documented. Documentation of the HPI, Physical Exam and Medical Decision Making performed by medical students or scribes is based on my personal performance of the HPI, PE and MDM.   For Phys Assistant/ Nurse Practitioner cases/documentation I have personally evaluated this patient and have completed at least one if not all key elements of the E/M (history, physical exam, and MDM). I find the patient's history and physical exam are consistent with the NP/PA documentation. I agree with the care provided, treatment rendered, disposition and followup plan.   Additional findings are as noted.    Claudio Mendez MD  Attending Emergency  Physician      This patient presents to the emergency department with complaints of a rash which she has been experiencing over the past 4 to 5 days.  She denies any fevers or chills.  No cough or sore throat.  No abdominal pain.  No obvious history of allergenic or irritant exposure.  Patient reports she has been using an over-the-counter homeopathic antifungal topical preparation without resolution of her symptoms.  She apparently was evaluated at an urgent care center just prior to this visit and told that she had pityriasis and was prescribed steroids.  Examination skin reveals scattered flesh-colored slightly raised, nontender papular lesions primarily on the upper extremities, anterior trunk, inframammary region, and

## 2024-04-05 NOTE — DISCHARGE INSTRUCTIONS
You were evaluated the emergency department for rash.  This may be scabies.  You are given a prescription for permethrin cream.  Please take this as prescribed.  You can stop taking the medications that were prescribed to you by urgent care.  Please follow-up with your primary care physician as soon as possible.  Please return to the emergency department for any worsening symptoms, questions or concerns.

## 2024-04-06 ASSESSMENT — ENCOUNTER SYMPTOMS: SHORTNESS OF BREATH: 0
